# Patient Record
Sex: FEMALE | URBAN - METROPOLITAN AREA
[De-identification: names, ages, dates, MRNs, and addresses within clinical notes are randomized per-mention and may not be internally consistent; named-entity substitution may affect disease eponyms.]

---

## 2023-02-28 LAB
ALANINE AMINOTRANSFERASE (SGPT) (U/L) IN SER/PLAS: 13 U/L (ref 7–45)
ALBUMIN (G/DL) IN SER/PLAS: 4.4 G/DL (ref 3.4–5)
ALKALINE PHOSPHATASE (U/L) IN SER/PLAS: 76 U/L (ref 33–136)
ANION GAP IN SER/PLAS: 9 MMOL/L (ref 10–20)
ASPARTATE AMINOTRANSFERASE (SGOT) (U/L) IN SER/PLAS: 30 U/L (ref 9–39)
BILIRUBIN TOTAL (MG/DL) IN SER/PLAS: 1.3 MG/DL (ref 0–1.2)
CALCIUM (MG/DL) IN SER/PLAS: 9.8 MG/DL (ref 8.6–10.6)
CARBON DIOXIDE, TOTAL (MMOL/L) IN SER/PLAS: 34 MMOL/L (ref 21–32)
CHLORIDE (MMOL/L) IN SER/PLAS: 98 MMOL/L (ref 98–107)
CHOLESTEROL (MG/DL) IN SER/PLAS: 178 MG/DL (ref 0–199)
CHOLESTEROL IN HDL (MG/DL) IN SER/PLAS: 85.7 MG/DL
CHOLESTEROL/HDL RATIO: 2.1
CREATININE (MG/DL) IN SER/PLAS: 0.71 MG/DL (ref 0.5–1.05)
GFR FEMALE: 85 ML/MIN/1.73M2
GLUCOSE (MG/DL) IN SER/PLAS: 94 MG/DL (ref 74–99)
LDL: 78 MG/DL (ref 0–99)
POTASSIUM (MMOL/L) IN SER/PLAS: 3.9 MMOL/L (ref 3.5–5.3)
PROTEIN TOTAL: 7.2 G/DL (ref 6.4–8.2)
SODIUM (MMOL/L) IN SER/PLAS: 137 MMOL/L (ref 136–145)
TRIGLYCERIDE (MG/DL) IN SER/PLAS: 72 MG/DL (ref 0–149)
UREA NITROGEN (MG/DL) IN SER/PLAS: 16 MG/DL (ref 6–23)
VLDL: 14 MG/DL (ref 0–40)

## 2023-04-03 ENCOUNTER — OFFICE VISIT (OUTPATIENT)
Dept: PRIMARY CARE | Facility: CLINIC | Age: 83
End: 2023-04-03
Payer: MEDICARE

## 2023-04-03 VITALS
SYSTOLIC BLOOD PRESSURE: 142 MMHG | BODY MASS INDEX: 21.41 KG/M2 | WEIGHT: 102 LBS | HEART RATE: 60 BPM | OXYGEN SATURATION: 98 % | HEIGHT: 58 IN | DIASTOLIC BLOOD PRESSURE: 68 MMHG | TEMPERATURE: 97.2 F

## 2023-04-03 DIAGNOSIS — S06.9X0D TRAUMATIC BRAIN INJURY, WITHOUT LOSS OF CONSCIOUSNESS, SUBSEQUENT ENCOUNTER: ICD-10-CM

## 2023-04-03 DIAGNOSIS — G25.81 RESTLESS LEG SYNDROME: Primary | ICD-10-CM

## 2023-04-03 DIAGNOSIS — S06.9XAD TRAUMATIC BRAIN INJURY, WITH UNKNOWN LOSS OF CONSCIOUSNESS STATUS, SUBSEQUENT ENCOUNTER: ICD-10-CM

## 2023-04-03 PROCEDURE — 99213 OFFICE O/P EST LOW 20 MIN: CPT | Performed by: FAMILY MEDICINE

## 2023-04-03 PROCEDURE — 1170F FXNL STATUS ASSESSED: CPT | Performed by: FAMILY MEDICINE

## 2023-04-03 PROCEDURE — 1036F TOBACCO NON-USER: CPT | Performed by: FAMILY MEDICINE

## 2023-04-03 PROCEDURE — 1160F RVW MEDS BY RX/DR IN RCRD: CPT | Performed by: FAMILY MEDICINE

## 2023-04-03 PROCEDURE — 1159F MED LIST DOCD IN RCRD: CPT | Performed by: FAMILY MEDICINE

## 2023-04-03 RX ORDER — LOSARTAN POTASSIUM 50 MG/1
TABLET ORAL
COMMUNITY
Start: 2020-03-06 | End: 2023-08-14

## 2023-04-03 RX ORDER — SIMVASTATIN 20 MG/1
20 TABLET, FILM COATED ORAL NIGHTLY
COMMUNITY
Start: 2013-02-04 | End: 2023-05-01

## 2023-04-03 RX ORDER — HYDROCHLOROTHIAZIDE 12.5 MG/1
12.5 TABLET ORAL EVERY 12 HOURS
COMMUNITY
Start: 2020-03-06 | End: 2023-06-01 | Stop reason: SDUPTHER

## 2023-04-03 RX ORDER — CITALOPRAM 10 MG/1
10 TABLET ORAL DAILY
COMMUNITY
Start: 2020-12-14 | End: 2023-09-11

## 2023-04-03 RX ORDER — CALCIUM CITRATE/VITAMIN D3 200MG-6.25
2 TABLET ORAL DAILY
COMMUNITY
Start: 2016-11-01

## 2023-04-03 RX ORDER — DONEPEZIL HYDROCHLORIDE 10 MG/1
10 TABLET, FILM COATED ORAL
COMMUNITY
Start: 2023-03-02

## 2023-04-03 ASSESSMENT — ACTIVITIES OF DAILY LIVING (ADL)
DOING_HOUSEWORK: INDEPENDENT
DRESSING: INDEPENDENT
MANAGING_FINANCES: NEEDS ASSISTANCE
BATHING: INDEPENDENT
GROCERY_SHOPPING: NEEDS ASSISTANCE
TAKING_MEDICATION: INDEPENDENT

## 2023-04-03 ASSESSMENT — ENCOUNTER SYMPTOMS
LOSS OF SENSATION IN FEET: 0
FATIGUE: 0
ACTIVITY CHANGE: 0
GASTROINTESTINAL NEGATIVE: 1
OCCASIONAL FEELINGS OF UNSTEADINESS: 0
SLEEP DISTURBANCE: 1
DIAPHORESIS: 0
MUSCULOSKELETAL NEGATIVE: 1
CARDIOVASCULAR NEGATIVE: 1
APPETITE CHANGE: 0
DEPRESSION: 0
NERVOUS/ANXIOUS: 0
AGITATION: 0

## 2023-04-03 ASSESSMENT — COLUMBIA-SUICIDE SEVERITY RATING SCALE - C-SSRS
2. HAVE YOU ACTUALLY HAD ANY THOUGHTS OF KILLING YOURSELF?: NO
6. HAVE YOU EVER DONE ANYTHING, STARTED TO DO ANYTHING, OR PREPARED TO DO ANYTHING TO END YOUR LIFE?: NO
1. IN THE PAST MONTH, HAVE YOU WISHED YOU WERE DEAD OR WISHED YOU COULD GO TO SLEEP AND NOT WAKE UP?: NO

## 2023-04-03 ASSESSMENT — PATIENT HEALTH QUESTIONNAIRE - PHQ9
SUM OF ALL RESPONSES TO PHQ9 QUESTIONS 1 AND 2: 0
1. LITTLE INTEREST OR PLEASURE IN DOING THINGS: NOT AT ALL
2. FEELING DOWN, DEPRESSED OR HOPELESS: NOT AT ALL

## 2023-04-03 NOTE — PROGRESS NOTES
Subjective   Patient ID: Felicitas Vo is a 83 y.o. female who presents for Follow-up (Medications/).  Patient has been started on Aricept therapy.  Has been doing overall well    HPI patient presents for follow-up. Doing well.  2 days week.  Working on balance.  Continues to go twice a week for workout program.  She has been eating well plenty of food at home.    She has been able to dress herself and do other activities her son helps her with medication management.  She is getting some leg cramping at night.    Sometimes legs feel little uneasy at night as well.  Denies any real jerking motion associated with this.    No difficulties with headaches or double vision or blurring vision.  No troubles with sore throat or difficulty swallowing no abdominal pain or discomfort no blood in stool or black tarry stool.  No swelling of the legs or feet.  No numbness no tingling the legs or feet.    At night sometimes patient may notice it when the lights are turned off that she may see silhouettes of people.    She does not hear any noise when she turns a light on these things do not exist.    Uncertain as the timing of this or if it is associated with Aricept therapy.    Patient had history of traumatic brain injury but that was years ago she has had no subsequent troubles.  No headache she denies being off balance at this time        Review of Systems   Constitutional:  Negative for activity change, appetite change, diaphoresis and fatigue.   HENT:  Negative for ear pain, mouth sores and nosebleeds.    Respiratory: Negative.     Cardiovascular: Negative.    Gastrointestinal: Negative.    Endocrine: Negative for cold intolerance and heat intolerance.   Genitourinary: Negative.    Musculoskeletal: Negative.    Neurological:  Negative for tremors, seizures, syncope, speech difficulty, numbness and headaches.   Psychiatric/Behavioral:  Positive for sleep disturbance. Negative for agitation, behavioral problems, confusion,  "self-injury and suicidal ideas. The patient is not nervous/anxious.        Objective   /88   Pulse 60   Temp 36.2 °C (97.2 °F)   Ht 1.473 m (4' 10\")   Wt 46.3 kg (102 lb)   SpO2 98%   BMI 21.32 kg/m²   BSA Body surface area is 1.38 meters squared.      Physical Exam  Constitutional:       Appearance: Normal appearance.   HENT:      Head: Normocephalic.   Cardiovascular:      Rate and Rhythm: Normal rate and regular rhythm.   Pulmonary:      Effort: Pulmonary effort is normal.      Breath sounds: Normal breath sounds.   Abdominal:      General: Abdomen is flat.      Palpations: Abdomen is soft.   Musculoskeletal:         General: Normal range of motion.      Cervical back: Normal range of motion.   Neurological:      General: No focal deficit present.      Mental Status: She is alert. Mental status is at baseline.      Cranial Nerves: No cranial nerve deficit.      Sensory: No sensory deficit.      Motor: No weakness.      Coordination: Coordination normal.      Gait: Gait normal.      Comments: Cranial nerves II through XII are intact.    Deep tendon reflexes of lower extremities are equal and strong.  No calf tenderness.    Dorsalis pedis pulses are strong   Psychiatric:         Mood and Affect: Mood normal.         Judgment: Judgment normal.       Orders Only on 02/28/2023   Component Date Value Ref Range Status    Cholesterol 02/28/2023 178  0 - 199 mg/dL Final    Comment: .      AGE      DESIRABLE   BORDERLINE HIGH   HIGH     0-19 Y     0 - 169       170 - 199     >/= 200    20-24 Y     0 - 189       190 - 224     >/= 225         >24 Y     0 - 199       200 - 239     >/= 240   **All ranges are based on fasting samples. Specific   therapeutic targets will vary based on patient-specific   cardiac risk.  .   Pediatric guidelines reference:Pediatrics 2011, 128(S5).   Adult guidelines reference: NCEP ATPIII Guidelines,     PADDY 2001, 258:2486-97  .   Venipuncture immediately after or during the    " administration of Metamizole may lead to falsely   low results. Testing should be performed immediately   prior to Metamizole dosing.      HDL 02/28/2023 85.7  mg/dL Final    Comment: .      AGE      VERY LOW   LOW     NORMAL    HIGH       0-19 Y       < 35   < 40     40-45     ----    20-24 Y       ----   < 40       >45     ----      >24 Y       ----   < 40     40-60      >60  .      Cholesterol/HDL Ratio 02/28/2023 2.1   Final    Comment: REF VALUES  DESIRABLE  < 3.4  HIGH RISK  > 5.0      LDL 02/28/2023 78  0 - 99 mg/dL Final    Comment: .                           NEAR      BORD      AGE      DESIRABLE  OPTIMAL    HIGH     HIGH     VERY HIGH     0-19 Y     0 - 109     ---    110-129   >/= 130     ----    20-24 Y     0 - 119     ---    120-159   >/= 160     ----      >24 Y     0 -  99   100-129  130-159   160-189     >/=190  .      VLDL 02/28/2023 14  0 - 40 mg/dL Final    Triglycerides 02/28/2023 72  0 - 149 mg/dL Final    Comment: .      AGE      DESIRABLE   BORDERLINE HIGH   HIGH     VERY HIGH   0 D-90 D    19 - 174         ----         ----        ----  91 D- 9 Y     0 -  74        75 -  99     >/= 100      ----    10-19 Y     0 -  89        90 - 129     >/= 130      ----    20-24 Y     0 - 114       115 - 149     >/= 150      ----         >24 Y     0 - 149       150 - 199    200- 499    >/= 500  .   Venipuncture immediately after or during the    administration of Metamizole may lead to falsely   low results. Testing should be performed immediately   prior to Metamizole dosing.     Orders Only on 02/28/2023   Component Date Value Ref Range Status    Glucose 02/28/2023 94  74 - 99 mg/dL Final    Sodium 02/28/2023 137  136 - 145 mmol/L Final    Potassium 02/28/2023 3.9  3.5 - 5.3 mmol/L Final    Chloride 02/28/2023 98  98 - 107 mmol/L Final    Bicarbonate 02/28/2023 34 (H)  21 - 32 mmol/L Final    Anion Gap 02/28/2023 9 (L)  10 - 20 mmol/L Final    Urea Nitrogen 02/28/2023 16  6 - 23 mg/dL Final    Creatinine  02/28/2023 0.71  0.50 - 1.05 mg/dL Final    GFR Female 02/28/2023 85  >90 mL/min/1.73m2 Final    Comment:  CALCULATIONS OF ESTIMATED GFR ARE PERFORMED   USING THE 2021 CKD-EPI STUDY REFIT EQUATION   WITHOUT THE RACE VARIABLE FOR THE IDMS-TRACEABLE   CREATININE METHODS.    https://jasn.asnjournals.org/content/early/2021/09/22/ASN.9254138219      Calcium 02/28/2023 9.8  8.6 - 10.6 mg/dL Final    Albumin 02/28/2023 4.4  3.4 - 5.0 g/dL Final    Alkaline Phosphatase 02/28/2023 76  33 - 136 U/L Final    Total Protein 02/28/2023 7.2  6.4 - 8.2 g/dL Final    AST 02/28/2023 30  9 - 39 U/L Final    Total Bilirubin 02/28/2023 1.3 (H)  0.0 - 1.2 mg/dL Final    ALT (SGPT) 02/28/2023 13  7 - 45 U/L Final    Comment:  Patients treated with Sulfasalazine may generate    falsely decreased results for ALT.     Legacy Encounter on 06/09/2022   Component Date Value Ref Range Status    Vitamin B-12 06/09/2022 853  211 - 911 pg/mL Final    Glucose 06/09/2022 89  74 - 99 mg/dL Final    Sodium 06/09/2022 137  136 - 145 mmol/L Final    Potassium 06/09/2022 3.8  3.5 - 5.3 mmol/L Final    Chloride 06/09/2022 100  98 - 107 mmol/L Final    Bicarbonate 06/09/2022 30  21 - 32 mmol/L Final    Anion Gap 06/09/2022 11  10 - 20 mmol/L Final    Urea Nitrogen 06/09/2022 21  6 - 23 mg/dL Final    Creatinine 06/09/2022 0.68  0.50 - 1.05 mg/dL Final    GFR Female 06/09/2022 88  >90 mL/min/1.73m2 Final    Comment:  CALCULATIONS OF ESTIMATED GFR ARE PERFORMED   USING THE 2021 CKD-EPI STUDY REFIT EQUATION   WITHOUT THE RACE VARIABLE FOR THE IDMS-TRACEABLE   CREATININE METHODS.    https://jasn.asnjournals.org/content/early/2021/09/22/ASN.7366874630      Calcium 06/09/2022 9.4  8.6 - 10.6 mg/dL Final    Albumin 06/09/2022 4.3  3.4 - 5.0 g/dL Final    Alkaline Phosphatase 06/09/2022 59  33 - 136 U/L Final    Total Protein 06/09/2022 6.9  6.4 - 8.2 g/dL Final    AST 06/09/2022 31  9 - 39 U/L Final    Total Bilirubin 06/09/2022 1.1  0.0 - 1.2 mg/dL Final    ALT  (SGPT) 06/09/2022 14  7 - 45 U/L Final    Comment:  Patients treated with Sulfasalazine may generate    falsely decreased results for ALT.      TSH 06/09/2022 4.15 (H)  0.44 - 3.98 mIU/L Final    Comment:  TSH testing is performed using different testing    methodology at [facility] than at other    [facility]. Direct result comparisons should    only be made within the same method.      Cholesterol 06/09/2022 164  0 - 199 mg/dL Final    Comment: .      AGE      DESIRABLE   BORDERLINE HIGH   HIGH     0-19 Y     0 - 169       170 - 199     >/= 200    20-24 Y     0 - 189       190 - 224     >/= 225         >24 Y     0 - 199       200 - 239     >/= 240   **All ranges are based on fasting samples. Specific   therapeutic targets will vary based on patient-specific   cardiac risk.  .   Pediatric guidelines reference:Pediatrics 2011, 128(S5).   Adult guidelines reference: NCEP ATPIII Guidelines,     PADDY 2001, 258:2486-97  .   Venipuncture immediately after or during the    administration of Metamizole may lead to falsely   low results. Testing should be performed immediately   prior to Metamizole dosing.      HDL 06/09/2022 78.0  mg/dL Final    Comment: .      AGE      VERY LOW   LOW     NORMAL    HIGH       0-19 Y       < 35   < 40     40-45     ----    20-24 Y       ----   < 40       >45     ----      >24 Y       ----   < 40     40-60      >60  .      Cholesterol/HDL Ratio 06/09/2022 2.1   Final    Comment: REF VALUES  DESIRABLE  < 3.4  HIGH RISK  > 5.0      LDL 06/09/2022 73  0 - 99 mg/dL Final    Comment: .                           NEAR      BORD      AGE      DESIRABLE  OPTIMAL    HIGH     HIGH     VERY HIGH     0-19 Y     0 - 109     ---    110-129   >/= 130     ----    20-24 Y     0 - 119     ---    120-159   >/= 160     ----      >24 Y     0 -  99   100-129  130-159   160-189     >/=190  .      VLDL 06/09/2022 13  0 - 40 mg/dL Final    Triglycerides 06/09/2022 67  0 - 149 mg/dL Final     Comment: .      AGE      DESIRABLE   BORDERLINE HIGH   HIGH     VERY HIGH   0 D-90 D    19 - 174         ----         ----        ----  91 D- 9 Y     0 -  74        75 -  99     >/= 100      ----    10-19 Y     0 -  89        90 - 129     >/= 130      ----    20-24 Y     0 - 114       115 - 149     >/= 150      ----         >24 Y     0 - 149       150 - 199    200- 499    >/= 500  .   Venipuncture immediately after or during the    administration of Metamizole may lead to falsely   low results. Testing should be performed immediately   prior to Metamizole dosing.      WBC 06/09/2022 4.0 (L)  4.4 - 11.3 x10E9/L Final    nRBC 06/09/2022 0.0  0.0 - 0.0 /100 WBC Final    RBC 06/09/2022 4.25  4.00 - 5.20 x10E12/L Final    Hemoglobin 06/09/2022 13.4  12.0 - 16.0 g/dL Final    Hematocrit 06/09/2022 41.5  36.0 - 46.0 % Final    MCV 06/09/2022 98  80 - 100 fL Final    MCHC 06/09/2022 32.3  32.0 - 36.0 g/dL Final    Platelets 06/09/2022 179  150 - 450 x10E9/L Final    RDW 06/09/2022 12.8  11.5 - 14.5 % Final    Neutrophils % 06/09/2022 63.7  40.0 - 80.0 % Final    Immature Granulocytes %, Automated 06/09/2022 0.2  0.0 - 0.9 % Final    Comment:  Immature Granulocyte Count (IG) includes promyelocytes,    myelocytes and metamyelocytes but does not include bands.   Percent differential counts (%) should be interpreted in the   context of the absolute cell counts (cells/L).      Lymphocytes % 06/09/2022 26.7  13.0 - 44.0 % Final    Monocytes % 06/09/2022 8.2  2.0 - 10.0 % Final    Eosinophils % 06/09/2022 1.0  0.0 - 6.0 % Final    Basophils % 06/09/2022 0.2  0.0 - 2.0 % Final    Neutrophils Absolute 06/09/2022 2.55  1.60 - 5.50 x10E9/L Final    Lymphocytes Absolute 06/09/2022 1.07  0.80 - 3.00 x10E9/L Final    Monocytes Absolute 06/09/2022 0.33  0.05 - 0.80 x10E9/L Final    Eosinophils Absolute 06/09/2022 0.04  0.00 - 0.40 x10E9/L Final    Basophils Absolute 06/09/2022 0.01  0.00 - 0.10 x10E9/L Final    Syphilis Total Ab  06/09/2022 NONREACTIVE  NONREACTIVE Final    Comment: No significant level of Treponema pallidum antibody detected. Repeat testing   in 2 to 4 weeks may be considered if early infection or incubating syphilis   infection is suspected.       Current Outpatient Medications on File Prior to Visit   Medication Sig Dispense Refill    anjali cit-mag-D3-Zn--man-bor (Citracal-D3 Plus Magnesium) 250- mg-mg-unit tablet Take 2 tablets by mouth once daily.      citalopram (CeleXA) 10 mg tablet Take 1 tablet (10 mg) by mouth once daily.      donepezil (Aricept) 10 mg tablet Take 1 tablet (10 mg) by mouth once daily.      hydroCHLOROthiazide (HYDRODiuril) 12.5 mg tablet Take 1 tablet (12.5 mg) by mouth in the morning and 1 tablet (12.5 mg) in the evening.      losartan (Cozaar) 50 mg tablet       multivit-min/iron/folic/lutein (CENTRUM SILVER WOMEN ORAL) Take 1 tablet by mouth once daily.      simvastatin (Zocor) 20 mg tablet Take 1 tablet (20 mg) by mouth once daily at bedtime.       No current facility-administered medications on file prior to visit.     No images are attached to the encounter.            Assessment/Plan   Problem List Items Addressed This Visit          Nervous    Restless leg syndrome - Primary     Evaluating for restless leg.  Lab studies being performed         Traumatic brain injury, without loss of consciousness, subsequent encounter     This was years ago and doing well.

## 2023-04-03 NOTE — PATIENT INSTRUCTIONS
Lab studies are being performed regarding restless leg.  Please do leg stretching as we have discussed.  Please continue follow-up with geriatric health clinic.    Medications reviewed and reconciled.

## 2023-04-04 ASSESSMENT — ENCOUNTER SYMPTOMS
SEIZURES: 0
SPEECH DIFFICULTY: 0
RESPIRATORY NEGATIVE: 1
TREMORS: 0
CONFUSION: 0
HEADACHES: 0
NUMBNESS: 0

## 2023-05-01 DIAGNOSIS — E78.5 HYPERLIPIDEMIA, UNSPECIFIED HYPERLIPIDEMIA TYPE: Primary | ICD-10-CM

## 2023-05-01 RX ORDER — SIMVASTATIN 20 MG/1
TABLET, FILM COATED ORAL
Qty: 90 TABLET | Refills: 0 | Status: SHIPPED | OUTPATIENT
Start: 2023-05-01 | End: 2023-08-01

## 2023-06-01 ENCOUNTER — TELEPHONE (OUTPATIENT)
Dept: PRIMARY CARE | Facility: CLINIC | Age: 83
End: 2023-06-01
Payer: MEDICARE

## 2023-06-01 DIAGNOSIS — I10 BENIGN ESSENTIAL HYPERTENSION: Primary | ICD-10-CM

## 2023-06-01 DIAGNOSIS — I10 BENIGN ESSENTIAL HYPERTENSION: ICD-10-CM

## 2023-06-01 PROBLEM — R00.2 PALPITATIONS: Status: ACTIVE | Noted: 2023-06-01

## 2023-06-01 PROBLEM — M25.812 SHOULDER IMPINGEMENT, LEFT: Status: ACTIVE | Noted: 2023-06-01

## 2023-06-01 PROBLEM — G30.1 MILD LATE ONSET ALZHEIMER'S DEMENTIA WITHOUT BEHAVIORAL DISTURBANCE, PSYCHOTIC DISTURBANCE, MOOD DISTURBANCE, OR ANXIETY (MULTI): Status: ACTIVE | Noted: 2023-03-02

## 2023-06-01 PROBLEM — R35.0 URINARY FREQUENCY: Status: ACTIVE | Noted: 2023-06-01

## 2023-06-01 PROBLEM — R60.0 EDEMA OF BOTH LEGS: Status: ACTIVE | Noted: 2023-06-01

## 2023-06-01 PROBLEM — S06.9XAA TRAUMATIC BRAIN INJURY (MULTI): Status: ACTIVE | Noted: 2023-04-03

## 2023-06-01 PROBLEM — N85.9 FLUID IN ENDOMETRIAL CAVITY: Status: ACTIVE | Noted: 2023-06-01

## 2023-06-01 PROBLEM — U07.1 COVID-19: Status: ACTIVE | Noted: 2023-06-01

## 2023-06-01 PROBLEM — R05.9 COUGH: Status: ACTIVE | Noted: 2023-06-01

## 2023-06-01 PROBLEM — R63.4 WEIGHT LOSS: Status: ACTIVE | Noted: 2023-06-01

## 2023-06-01 PROBLEM — H81.399 PERIPHERAL VERTIGO: Status: ACTIVE | Noted: 2023-06-01

## 2023-06-01 PROBLEM — F43.20 REACTION, SITUATIONAL: Status: ACTIVE | Noted: 2023-06-01

## 2023-06-01 PROBLEM — F02.A0 MILD LATE ONSET ALZHEIMER'S DEMENTIA WITHOUT BEHAVIORAL DISTURBANCE, PSYCHOTIC DISTURBANCE, MOOD DISTURBANCE, OR ANXIETY (MULTI): Status: ACTIVE | Noted: 2023-03-02

## 2023-06-01 PROBLEM — M81.0 OSTEOPOROSIS: Status: ACTIVE | Noted: 2023-06-01

## 2023-06-01 RX ORDER — HYDROCHLOROTHIAZIDE 12.5 MG/1
12.5 TABLET ORAL EVERY 12 HOURS
Qty: 90 TABLET | Refills: 3 | Status: SHIPPED | OUTPATIENT
Start: 2023-06-01 | End: 2024-05-28

## 2023-06-01 RX ORDER — HYDROCHLOROTHIAZIDE 12.5 MG/1
12.5 TABLET ORAL EVERY 12 HOURS
Qty: 90 TABLET | Refills: 1 | Status: CANCELLED | OUTPATIENT
Start: 2023-06-01

## 2023-06-02 RX ORDER — HYDROCHLOROTHIAZIDE 12.5 MG/1
TABLET ORAL
Qty: 180 TABLET | Refills: 3 | OUTPATIENT
Start: 2023-06-02

## 2023-08-01 DIAGNOSIS — E78.5 HYPERLIPIDEMIA, UNSPECIFIED HYPERLIPIDEMIA TYPE: ICD-10-CM

## 2023-08-01 RX ORDER — SIMVASTATIN 20 MG/1
TABLET, FILM COATED ORAL
Qty: 90 TABLET | Refills: 3 | Status: SHIPPED | OUTPATIENT
Start: 2023-08-01

## 2023-08-14 DIAGNOSIS — I10 BENIGN ESSENTIAL HYPERTENSION: Primary | ICD-10-CM

## 2023-08-14 RX ORDER — LOSARTAN POTASSIUM 50 MG/1
TABLET ORAL
Qty: 180 TABLET | Refills: 3 | Status: SHIPPED | OUTPATIENT
Start: 2023-08-14

## 2023-09-11 DIAGNOSIS — F43.20 ADJUSTMENT DISORDER, UNSPECIFIED TYPE: Primary | ICD-10-CM

## 2023-09-11 RX ORDER — CITALOPRAM 10 MG/1
10 TABLET ORAL DAILY
Qty: 30 TABLET | Refills: 11 | Status: SHIPPED | OUTPATIENT
Start: 2023-09-11

## 2024-04-18 ENCOUNTER — LAB (OUTPATIENT)
Dept: LAB | Facility: LAB | Age: 84
End: 2024-04-18
Payer: MEDICARE

## 2024-04-18 ENCOUNTER — OFFICE VISIT (OUTPATIENT)
Dept: PRIMARY CARE | Facility: CLINIC | Age: 84
End: 2024-04-18
Payer: MEDICARE

## 2024-04-18 VITALS
OXYGEN SATURATION: 100 % | TEMPERATURE: 97.1 F | HEIGHT: 58 IN | DIASTOLIC BLOOD PRESSURE: 62 MMHG | SYSTOLIC BLOOD PRESSURE: 108 MMHG | BODY MASS INDEX: 21.62 KG/M2 | WEIGHT: 103 LBS | HEART RATE: 60 BPM

## 2024-04-18 DIAGNOSIS — I10 BENIGN ESSENTIAL HYPERTENSION: ICD-10-CM

## 2024-04-18 DIAGNOSIS — N64.89 HEMATOMA OF LEFT BREAST: Primary | ICD-10-CM

## 2024-04-18 DIAGNOSIS — N64.89 HEMATOMA OF LEFT BREAST: ICD-10-CM

## 2024-04-18 PROCEDURE — 1036F TOBACCO NON-USER: CPT | Performed by: FAMILY MEDICINE

## 2024-04-18 PROCEDURE — 85025 COMPLETE CBC W/AUTO DIFF WBC: CPT

## 2024-04-18 PROCEDURE — 1159F MED LIST DOCD IN RCRD: CPT | Performed by: FAMILY MEDICINE

## 2024-04-18 PROCEDURE — 36415 COLL VENOUS BLD VENIPUNCTURE: CPT

## 2024-04-18 PROCEDURE — 85610 PROTHROMBIN TIME: CPT

## 2024-04-18 PROCEDURE — 3078F DIAST BP <80 MM HG: CPT | Performed by: FAMILY MEDICINE

## 2024-04-18 PROCEDURE — 3074F SYST BP LT 130 MM HG: CPT | Performed by: FAMILY MEDICINE

## 2024-04-18 PROCEDURE — 99213 OFFICE O/P EST LOW 20 MIN: CPT | Performed by: FAMILY MEDICINE

## 2024-04-18 ASSESSMENT — PATIENT HEALTH QUESTIONNAIRE - PHQ9
10. IF YOU CHECKED OFF ANY PROBLEMS, HOW DIFFICULT HAVE THESE PROBLEMS MADE IT FOR YOU TO DO YOUR WORK, TAKE CARE OF THINGS AT HOME, OR GET ALONG WITH OTHER PEOPLE: NOT DIFFICULT AT ALL
SUM OF ALL RESPONSES TO PHQ9 QUESTIONS 1 AND 2: 0
1. LITTLE INTEREST OR PLEASURE IN DOING THINGS: NOT AT ALL
2. FEELING DOWN, DEPRESSED OR HOPELESS: NOT AT ALL

## 2024-04-18 ASSESSMENT — ENCOUNTER SYMPTOMS
BLOOD IN STOOL: 0
ABDOMINAL DISTENTION: 0
WHEEZING: 0
CHOKING: 0
DEPRESSION: 1
EYE ITCHING: 0
OCCASIONAL FEELINGS OF UNSTEADINESS: 0
LOSS OF SENSATION IN FEET: 0
EYE PAIN: 0

## 2024-04-18 NOTE — PATIENT INSTRUCTIONS
Lab studies are going to be performed because of bruising CBC, INR, PTT going to be performed.    Ultrasound and diagnostic mammogram going to be performed.

## 2024-04-18 NOTE — PROGRESS NOTES
"Subjective   Patient ID: Felicitas Vo is a 84 y.o. female who presents for Breast Problem (bruise).    Patient noted some bruising across the left breast area.  She had no pain no discomfort is unaware of any trauma to the area.    Patient had no nipple discharge.    No pain in the area of the armpit.  No troubles with abdominal pain or discomfort.  No troubles with swelling of the legs or feet.  She has had a little bit of bruising on the hand in the past.    No bleeding with brushing the teeth.  No troubles with blood in the stool or black stool or vaginal bleeding    Breast Problem   patient noted bruise in the area of the breast    Review of Systems   HENT:  Negative for ear discharge and mouth sores.    Eyes:  Negative for pain and itching.   Respiratory:  Negative for choking and wheezing.    Gastrointestinal:  Negative for abdominal distention and blood in stool.   Skin:         Bruising of the left breast and left hand       Objective   /62   Pulse 60   Temp 36.2 °C (97.1 °F)   Ht 1.473 m (4' 10\")   Wt 46.7 kg (103 lb)   SpO2 100%   BMI 21.53 kg/m²   BSA Body surface area is 1.38 meters squared.      Physical Exam  Constitutional:       Appearance: Normal appearance.   HENT:      Head: Normocephalic.   Cardiovascular:      Rate and Rhythm: Normal rate and regular rhythm.      Pulses: Normal pulses.   Pulmonary:      Effort: Pulmonary effort is normal.   Neurological:      Mental Status: She is alert.     No axillary mass noted.    Bruising noted across the breast at the level of the nipple.  I do not appreciate any nipple discharge.    Some fibrocystic changes felt.  I do not appreciate any obvious mass  No visits with results within 1 Year(s) from this visit.   Latest known visit with results is:   Orders Only on 02/28/2023   Component Date Value Ref Range Status    Cholesterol 02/28/2023 178  0 - 199 mg/dL Final    Comment: .      AGE      DESIRABLE   BORDERLINE HIGH   HIGH     0-19 Y     0 - " 169       170 - 199     >/= 200    20-24 Y     0 - 189       190 - 224     >/= 225         >24 Y     0 - 199       200 - 239     >/= 240   **All ranges are based on fasting samples. Specific   therapeutic targets will vary based on patient-specific   cardiac risk.  .   Pediatric guidelines reference:Pediatrics 2011, 128(S5).   Adult guidelines reference: NCEP ATPIII Guidelines,     PADDY 2001, 258:2486-97  .   Venipuncture immediately after or during the    administration of Metamizole may lead to falsely   low results. Testing should be performed immediately   prior to Metamizole dosing.      HDL 02/28/2023 85.7  mg/dL Final    Comment: .      AGE      VERY LOW   LOW     NORMAL    HIGH       0-19 Y       < 35   < 40     40-45     ----    20-24 Y       ----   < 40       >45     ----      >24 Y       ----   < 40     40-60      >60  .      Cholesterol/HDL Ratio 02/28/2023 2.1   Final    Comment: REF VALUES  DESIRABLE  < 3.4  HIGH RISK  > 5.0      LDL 02/28/2023 78  0 - 99 mg/dL Final    Comment: .                           NEAR      BORD      AGE      DESIRABLE  OPTIMAL    HIGH     HIGH     VERY HIGH     0-19 Y     0 - 109     ---    110-129   >/= 130     ----    20-24 Y     0 - 119     ---    120-159   >/= 160     ----      >24 Y     0 -  99   100-129  130-159   160-189     >/=190  .      VLDL 02/28/2023 14  0 - 40 mg/dL Final    Triglycerides 02/28/2023 72  0 - 149 mg/dL Final    Comment: .      AGE      DESIRABLE   BORDERLINE HIGH   HIGH     VERY HIGH   0 D-90 D    19 - 174         ----         ----        ----  91 D- 9 Y     0 -  74        75 -  99     >/= 100      ----    10-19 Y     0 -  89        90 - 129     >/= 130      ----    20-24 Y     0 - 114       115 - 149     >/= 150      ----         >24 Y     0 - 149       150 - 199    200- 499    >/= 500  .   Venipuncture immediately after or during the    administration of Metamizole may lead to falsely   low results. Testing should be performed immediately   prior  to Metamizole dosing.       Current Outpatient Medications on File Prior to Visit   Medication Sig Dispense Refill    anjali cit-mag-D3-Zn--man-bor (Citracal-D3 Plus Magnesium) 250- mg-mg-unit tablet Take 2 tablets by mouth once daily.      citalopram (CeleXA) 10 mg tablet TAKE ONE TABLET BY MOUTH EVERY DAY 30 tablet 11    donepezil (Aricept) 10 mg tablet Take 1 tablet (10 mg) by mouth once daily.      hydroCHLOROthiazide (HYDRODiuril) 12.5 mg tablet Take 1 tablet (12.5 mg) by mouth every 12 hours. 90 tablet 3    losartan (Cozaar) 50 mg tablet TAKE ONE TABLET BY MOUTH EVERY MORNING AND TAKE ONE TABLET BY MOUTH EVERY EVENING 180 tablet 3    multivit-min/iron/folic/lutein (CENTRUM SILVER WOMEN ORAL) Take 1 tablet by mouth once daily.      simvastatin (Zocor) 20 mg tablet TAKE ONE TABLET BY MOUTH EVERY EVENING (NEED BLOOD TEST AND APPOINTMENT WITH DR HOLLAND) 90 tablet 3     No current facility-administered medications on file prior to visit.     No images are attached to the encounter.            Assessment/Plan   Problem List Items Addressed This Visit             ICD-10-CM    Hematoma of left breast - Primary N64.89     Appears to have hematoma of the left breast.  To do a diagnostic mammogram and ultrasound for further evaluation.  CBC, INR PTT going to be performed

## 2024-04-18 NOTE — ASSESSMENT & PLAN NOTE
Appears to have hematoma of the left breast.  To do a diagnostic mammogram and ultrasound for further evaluation.  CBC, INR PTT going to be performed

## 2024-04-19 LAB
BASOPHILS # BLD AUTO: 0.01 X10*3/UL (ref 0–0.1)
BASOPHILS NFR BLD AUTO: 0.2 %
EOSINOPHIL # BLD AUTO: 0.08 X10*3/UL (ref 0–0.4)
EOSINOPHIL NFR BLD AUTO: 2 %
ERYTHROCYTE [DISTWIDTH] IN BLOOD BY AUTOMATED COUNT: 13.1 % (ref 11.5–14.5)
HCT VFR BLD AUTO: 42.9 % (ref 36–46)
HGB BLD-MCNC: 13.5 G/DL (ref 12–16)
IMM GRANULOCYTES # BLD AUTO: 0.01 X10*3/UL (ref 0–0.5)
IMM GRANULOCYTES NFR BLD AUTO: 0.2 % (ref 0–0.9)
INR PPP: 1 (ref 0.9–1.1)
LYMPHOCYTES # BLD AUTO: 0.84 X10*3/UL (ref 0.8–3)
LYMPHOCYTES NFR BLD AUTO: 20.8 %
MCH RBC QN AUTO: 30.6 PG (ref 26–34)
MCHC RBC AUTO-ENTMCNC: 31.5 G/DL (ref 32–36)
MCV RBC AUTO: 97 FL (ref 80–100)
MONOCYTES # BLD AUTO: 0.33 X10*3/UL (ref 0.05–0.8)
MONOCYTES NFR BLD AUTO: 8.2 %
NEUTROPHILS # BLD AUTO: 2.77 X10*3/UL (ref 1.6–5.5)
NEUTROPHILS NFR BLD AUTO: 68.6 %
NRBC BLD-RTO: 0 /100 WBCS (ref 0–0)
PLATELET # BLD AUTO: 165 X10*3/UL (ref 150–450)
PROTHROMBIN TIME: 11.3 SECONDS (ref 9.8–12.8)
RBC # BLD AUTO: 4.41 X10*6/UL (ref 4–5.2)
WBC # BLD AUTO: 4 X10*3/UL (ref 4.4–11.3)

## 2024-05-27 DIAGNOSIS — I10 BENIGN ESSENTIAL HYPERTENSION: ICD-10-CM

## 2024-05-28 RX ORDER — HYDROCHLOROTHIAZIDE 12.5 MG/1
12.5 TABLET ORAL EVERY 12 HOURS
Qty: 90 TABLET | Refills: 3 | Status: SHIPPED | OUTPATIENT
Start: 2024-05-28

## 2024-07-30 DIAGNOSIS — E78.5 HYPERLIPIDEMIA, UNSPECIFIED HYPERLIPIDEMIA TYPE: ICD-10-CM

## 2024-07-30 RX ORDER — SIMVASTATIN 20 MG/1
TABLET, FILM COATED ORAL
Qty: 90 TABLET | Refills: 3 | Status: SHIPPED | OUTPATIENT
Start: 2024-07-30

## 2024-08-29 DIAGNOSIS — K12.0 APHTHOUS ULCER OF MOUTH: Primary | ICD-10-CM

## 2024-08-29 RX ORDER — TRIAMCINOLONE ACETONIDE 1 MG/G
PASTE DENTAL 2 TIMES DAILY
Qty: 5 G | Refills: 0 | Status: SHIPPED | OUTPATIENT
Start: 2024-08-29 | End: 2025-08-29

## 2024-08-29 NOTE — PROGRESS NOTES
Treating for mouth ulcers sending in prescription for patient                                                                                      Treating for mouth ulcer.  Sending in prescription for patient

## 2024-09-21 DIAGNOSIS — F43.20 ADJUSTMENT DISORDER, UNSPECIFIED TYPE: ICD-10-CM

## 2024-09-23 RX ORDER — CITALOPRAM 10 MG/1
10 TABLET ORAL DAILY
Qty: 30 TABLET | Refills: 0 | Status: SHIPPED | OUTPATIENT
Start: 2024-09-23

## 2024-11-14 DIAGNOSIS — I10 BENIGN ESSENTIAL HYPERTENSION: ICD-10-CM

## 2024-11-14 RX ORDER — LOSARTAN POTASSIUM 50 MG/1
TABLET ORAL
Qty: 180 TABLET | Refills: 0 | Status: SHIPPED | OUTPATIENT
Start: 2024-11-14

## 2024-12-02 DIAGNOSIS — F43.20 ADJUSTMENT DISORDER, UNSPECIFIED TYPE: ICD-10-CM

## 2024-12-02 RX ORDER — CITALOPRAM 10 MG/1
10 TABLET ORAL DAILY
Qty: 90 TABLET | Refills: 1 | Status: SHIPPED | OUTPATIENT
Start: 2024-12-02

## 2025-01-17 DIAGNOSIS — E78.5 HYPERLIPIDEMIA, UNSPECIFIED HYPERLIPIDEMIA TYPE: ICD-10-CM

## 2025-01-17 DIAGNOSIS — I10 BENIGN ESSENTIAL HYPERTENSION: ICD-10-CM

## 2025-01-21 ENCOUNTER — LAB (OUTPATIENT)
Dept: LAB | Facility: LAB | Age: 85
End: 2025-01-21
Payer: MEDICARE

## 2025-01-21 DIAGNOSIS — E78.5 HYPERLIPIDEMIA, UNSPECIFIED HYPERLIPIDEMIA TYPE: ICD-10-CM

## 2025-01-21 DIAGNOSIS — I10 BENIGN ESSENTIAL HYPERTENSION: ICD-10-CM

## 2025-01-21 LAB
ALBUMIN SERPL BCP-MCNC: 4.6 G/DL (ref 3.4–5)
ALP SERPL-CCNC: 73 U/L (ref 33–136)
ALT SERPL W P-5'-P-CCNC: 13 U/L (ref 7–45)
ANION GAP SERPL CALC-SCNC: 10 MMOL/L (ref 10–20)
AST SERPL W P-5'-P-CCNC: 27 U/L (ref 9–39)
BASOPHILS # BLD AUTO: 0.02 X10*3/UL (ref 0–0.1)
BASOPHILS NFR BLD AUTO: 0.5 %
BILIRUB SERPL-MCNC: 1.2 MG/DL (ref 0–1.2)
BUN SERPL-MCNC: 27 MG/DL (ref 6–23)
CALCIUM SERPL-MCNC: 9.7 MG/DL (ref 8.6–10.6)
CHLORIDE SERPL-SCNC: 101 MMOL/L (ref 98–107)
CHOLEST SERPL-MCNC: 190 MG/DL (ref 0–199)
CHOLESTEROL/HDL RATIO: 2.4
CO2 SERPL-SCNC: 33 MMOL/L (ref 21–32)
CREAT SERPL-MCNC: 0.74 MG/DL (ref 0.5–1.05)
EGFRCR SERPLBLD CKD-EPI 2021: 81 ML/MIN/1.73M*2
EOSINOPHIL # BLD AUTO: 0.13 X10*3/UL (ref 0–0.4)
EOSINOPHIL NFR BLD AUTO: 3 %
ERYTHROCYTE [DISTWIDTH] IN BLOOD BY AUTOMATED COUNT: 12.6 % (ref 11.5–14.5)
GLUCOSE SERPL-MCNC: 95 MG/DL (ref 74–99)
HCT VFR BLD AUTO: 41.7 % (ref 36–46)
HDLC SERPL-MCNC: 79.9 MG/DL
HGB BLD-MCNC: 13.4 G/DL (ref 12–16)
IMM GRANULOCYTES # BLD AUTO: 0.02 X10*3/UL (ref 0–0.5)
IMM GRANULOCYTES NFR BLD AUTO: 0.5 % (ref 0–0.9)
LDLC SERPL CALC-MCNC: 88 MG/DL
LYMPHOCYTES # BLD AUTO: 1.09 X10*3/UL (ref 0.8–3)
LYMPHOCYTES NFR BLD AUTO: 25.3 %
MCH RBC QN AUTO: 31.1 PG (ref 26–34)
MCHC RBC AUTO-ENTMCNC: 32.1 G/DL (ref 32–36)
MCV RBC AUTO: 97 FL (ref 80–100)
MONOCYTES # BLD AUTO: 0.41 X10*3/UL (ref 0.05–0.8)
MONOCYTES NFR BLD AUTO: 9.5 %
NEUTROPHILS # BLD AUTO: 2.63 X10*3/UL (ref 1.6–5.5)
NEUTROPHILS NFR BLD AUTO: 61.2 %
NON HDL CHOLESTEROL: 110 MG/DL (ref 0–149)
NRBC BLD-RTO: 0 /100 WBCS (ref 0–0)
PLATELET # BLD AUTO: 190 X10*3/UL (ref 150–450)
POTASSIUM SERPL-SCNC: 3.9 MMOL/L (ref 3.5–5.3)
PROT SERPL-MCNC: 7.1 G/DL (ref 6.4–8.2)
RBC # BLD AUTO: 4.31 X10*6/UL (ref 4–5.2)
SODIUM SERPL-SCNC: 140 MMOL/L (ref 136–145)
T4 FREE SERPL-MCNC: 1.22 NG/DL (ref 0.78–1.48)
TRIGL SERPL-MCNC: 111 MG/DL (ref 0–149)
TSH SERPL-ACNC: 5.47 MIU/L (ref 0.44–3.98)
VLDL: 22 MG/DL (ref 0–40)
WBC # BLD AUTO: 4.3 X10*3/UL (ref 4.4–11.3)

## 2025-01-21 PROCEDURE — 84439 ASSAY OF FREE THYROXINE: CPT

## 2025-01-21 PROCEDURE — 85025 COMPLETE CBC W/AUTO DIFF WBC: CPT

## 2025-01-21 PROCEDURE — 80061 LIPID PANEL: CPT

## 2025-01-21 PROCEDURE — 80053 COMPREHEN METABOLIC PANEL: CPT

## 2025-01-21 PROCEDURE — 84443 ASSAY THYROID STIM HORMONE: CPT

## 2025-01-23 DIAGNOSIS — E03.9 HYPOTHYROIDISM, UNSPECIFIED TYPE: Primary | ICD-10-CM

## 2025-01-23 DIAGNOSIS — R79.89 ELEVATED TSH: ICD-10-CM

## 2025-01-23 DIAGNOSIS — I10 BENIGN ESSENTIAL HYPERTENSION: ICD-10-CM

## 2025-01-23 RX ORDER — LEVOTHYROXINE SODIUM 50 UG/1
50 TABLET ORAL DAILY
Qty: 60 TABLET | Refills: 0 | Status: SHIPPED | OUTPATIENT
Start: 2025-01-23 | End: 2026-01-23

## 2025-01-27 ENCOUNTER — APPOINTMENT (OUTPATIENT)
Dept: PRIMARY CARE | Facility: CLINIC | Age: 85
End: 2025-01-27
Payer: MEDICARE

## 2025-01-27 VITALS
OXYGEN SATURATION: 97 % | HEART RATE: 68 BPM | SYSTOLIC BLOOD PRESSURE: 136 MMHG | BODY MASS INDEX: 24.16 KG/M2 | TEMPERATURE: 97.5 F | DIASTOLIC BLOOD PRESSURE: 82 MMHG | HEIGHT: 57 IN | WEIGHT: 112 LBS

## 2025-01-27 DIAGNOSIS — R29.898 WEAKNESS OF BOTH LEGS: ICD-10-CM

## 2025-01-27 DIAGNOSIS — E03.9 HYPOTHYROIDISM, UNSPECIFIED TYPE: ICD-10-CM

## 2025-01-27 DIAGNOSIS — R42 VERTIGO: Primary | ICD-10-CM

## 2025-01-27 PROCEDURE — 3075F SYST BP GE 130 - 139MM HG: CPT | Performed by: FAMILY MEDICINE

## 2025-01-27 PROCEDURE — 1159F MED LIST DOCD IN RCRD: CPT | Performed by: FAMILY MEDICINE

## 2025-01-27 PROCEDURE — 99214 OFFICE O/P EST MOD 30 MIN: CPT | Performed by: FAMILY MEDICINE

## 2025-01-27 PROCEDURE — 3079F DIAST BP 80-89 MM HG: CPT | Performed by: FAMILY MEDICINE

## 2025-01-27 ASSESSMENT — ENCOUNTER SYMPTOMS
OCCASIONAL FEELINGS OF UNSTEADINESS: 1
LOSS OF SENSATION IN FEET: 0
DEPRESSION: 0
EYE REDNESS: 0
AGITATION: 0
RESPIRATORY NEGATIVE: 1
UNEXPECTED WEIGHT CHANGE: 0
DECREASED CONCENTRATION: 0
SHORTNESS OF BREATH: 0
BACK PAIN: 0
GASTROINTESTINAL NEGATIVE: 1
EYE ITCHING: 0
WEAKNESS: 1
APPETITE CHANGE: 0
CONSTITUTIONAL NEGATIVE: 1
CHEST TIGHTNESS: 0
FATIGUE: 0
NUMBNESS: 0

## 2025-01-27 ASSESSMENT — PATIENT HEALTH QUESTIONNAIRE - PHQ9
SUM OF ALL RESPONSES TO PHQ9 QUESTIONS 1 AND 2: 0
2. FEELING DOWN, DEPRESSED OR HOPELESS: NOT AT ALL
10. IF YOU CHECKED OFF ANY PROBLEMS, HOW DIFFICULT HAVE THESE PROBLEMS MADE IT FOR YOU TO DO YOUR WORK, TAKE CARE OF THINGS AT HOME, OR GET ALONG WITH OTHER PEOPLE: NOT DIFFICULT AT ALL
1. LITTLE INTEREST OR PLEASURE IN DOING THINGS: NOT AT ALL

## 2025-01-27 NOTE — PATIENT INSTRUCTIONS
Checking labs in the next 4-week including TSH, CPK, sed rate, CRP.    Ordering physical therapy for general strengthening and also for Epley maneuvers regarding vertigo.    Would like to know how you are feeling in the next 2 weeks.    All findings were discussed with patient and her sons.    Supplementing thyroid at this time.

## 2025-01-27 NOTE — ASSESSMENT & PLAN NOTE
TSH remains slightly elevated we are going to supplement with Synthroid and then we will recheck TSH level in 4 weeks

## 2025-01-27 NOTE — PROGRESS NOTES
"Subjective   Patient ID: Felicitas Vo is a 84 y.o. female who presents for Dizziness (gait instability ).    Patient presents having had issues of dizziness.  Her emergency    Over the last several weeks she has had dizziness suicidal history of vertigo in the past.  Also complains of some weakness in the legs.  Seems to be associated with the dizziness she states normally it is when she lays down onto the right side.  With getting up it seems to be causing the dizziness that room seems to be spinning around.  She has had no focal sensory or motor neurologic deficit.    She has had no swelling of the legs or feet.  No chest pain or shortness of breath no headache         Review of Systems   Constitutional: Negative.  Negative for appetite change, fatigue and unexpected weight change.   HENT: Negative.  Negative for drooling, ear pain and postnasal drip.    Eyes:  Negative for redness and itching.   Respiratory: Negative.  Negative for chest tightness and shortness of breath.    Cardiovascular:  Negative for leg swelling.   Gastrointestinal: Negative.    Musculoskeletal:  Negative for back pain.   Neurological:  Positive for weakness. Negative for numbness.   Psychiatric/Behavioral:  Negative for agitation and decreased concentration.        Objective   /82   Pulse 68   Temp 36.4 °C (97.5 °F)   Ht 1.448 m (4' 9\")   Wt 50.8 kg (112 lb)   SpO2 97%   BMI 24.24 kg/m²   BSA Body surface area is 1.43 meters squared.      Physical Exam  Constitutional:       General: She is not in acute distress.     Appearance: Normal appearance. She is not ill-appearing, toxic-appearing or diaphoretic.   HENT:      Head: Normocephalic.   Cardiovascular:      Rate and Rhythm: Normal rate and regular rhythm.      Pulses: Normal pulses.   Pulmonary:      Effort: Pulmonary effort is normal.   Musculoskeletal:      Cervical back: Normal range of motion.   Neurological:      General: No focal deficit present.      Mental Status: " She is alert and oriented to person, place, and time.      Cranial Nerves: No cranial nerve deficit.      Sensory: No sensory deficit.      Motor: Weakness present.      Gait: Gait abnormal.      Deep Tendon Reflexes: Reflexes normal.     Pam-Hallpike maneuver revealed some evidence of vertigo on the right side.  Some nystagmus appreciated.  Subsided at 45 seconds.  When coming up reproduce the vertigo again.    Cranial nerves II through XII are intact.    Complains of some weakness with squatting.  No redness no warmth noted of the thighs.  Able to stand on tiptoes and heels.  Flexion extension of the knee are intact.  Dorsiflexion of the foot are intact.  Lab on 01/21/2025   Component Date Value Ref Range Status    WBC 01/21/2025 4.3 (L)  4.4 - 11.3 x10*3/uL Final    nRBC 01/21/2025 0.0  0.0 - 0.0 /100 WBCs Final    RBC 01/21/2025 4.31  4.00 - 5.20 x10*6/uL Final    Hemoglobin 01/21/2025 13.4  12.0 - 16.0 g/dL Final    Hematocrit 01/21/2025 41.7  36.0 - 46.0 % Final    MCV 01/21/2025 97  80 - 100 fL Final    MCH 01/21/2025 31.1  26.0 - 34.0 pg Final    MCHC 01/21/2025 32.1  32.0 - 36.0 g/dL Final    RDW 01/21/2025 12.6  11.5 - 14.5 % Final    Platelets 01/21/2025 190  150 - 450 x10*3/uL Final    Neutrophils % 01/21/2025 61.2  40.0 - 80.0 % Final    Immature Granulocytes %, Automated 01/21/2025 0.5  0.0 - 0.9 % Final    Immature Granulocyte Count (IG) includes promyelocytes, myelocytes and metamyelocytes but does not include bands. Percent differential counts (%) should be interpreted in the context of the absolute cell counts (cells/UL).    Lymphocytes % 01/21/2025 25.3  13.0 - 44.0 % Final    Monocytes % 01/21/2025 9.5  2.0 - 10.0 % Final    Eosinophils % 01/21/2025 3.0  0.0 - 6.0 % Final    Basophils % 01/21/2025 0.5  0.0 - 2.0 % Final    Neutrophils Absolute 01/21/2025 2.63  1.60 - 5.50 x10*3/uL Final    Percent differential counts (%) should be interpreted in the context of the absolute cell counts (cells/uL).     Immature Granulocytes Absolute, Au* 01/21/2025 0.02  0.00 - 0.50 x10*3/uL Final    Lymphocytes Absolute 01/21/2025 1.09  0.80 - 3.00 x10*3/uL Final    Monocytes Absolute 01/21/2025 0.41  0.05 - 0.80 x10*3/uL Final    Eosinophils Absolute 01/21/2025 0.13  0.00 - 0.40 x10*3/uL Final    Basophils Absolute 01/21/2025 0.02  0.00 - 0.10 x10*3/uL Final    Glucose 01/21/2025 95  74 - 99 mg/dL Final    Sodium 01/21/2025 140  136 - 145 mmol/L Final    Potassium 01/21/2025 3.9  3.5 - 5.3 mmol/L Final    Chloride 01/21/2025 101  98 - 107 mmol/L Final    Bicarbonate 01/21/2025 33 (H)  21 - 32 mmol/L Final    Anion Gap 01/21/2025 10  10 - 20 mmol/L Final    Urea Nitrogen 01/21/2025 27 (H)  6 - 23 mg/dL Final    Creatinine 01/21/2025 0.74  0.50 - 1.05 mg/dL Final    eGFR 01/21/2025 81  >60 mL/min/1.73m*2 Final    Calculations of estimated GFR are performed using the 2021 CKD-EPI Study Refit equation without the race variable for the IDMS-Traceable creatinine methods.  https://jasn.asnjournals.org/content/early/2021/09/22/ASN.9563647727    Calcium 01/21/2025 9.7  8.6 - 10.6 mg/dL Final    Albumin 01/21/2025 4.6  3.4 - 5.0 g/dL Final    Alkaline Phosphatase 01/21/2025 73  33 - 136 U/L Final    Total Protein 01/21/2025 7.1  6.4 - 8.2 g/dL Final    AST 01/21/2025 27  9 - 39 U/L Final    Bilirubin, Total 01/21/2025 1.2  0.0 - 1.2 mg/dL Final    ALT 01/21/2025 13  7 - 45 U/L Final    Patients treated with Sulfasalazine may generate falsely decreased results for ALT.    Cholesterol 01/21/2025 190  0 - 199 mg/dL Final          Age      Desirable   Borderline High   High     0-19 Y     0 - 169       170 - 199     >/= 200    20-24 Y     0 - 189       190 - 224     >/= 225         >24 Y     0 - 199       200 - 239     >/= 240   **All ranges are based on fasting samples. Specific   therapeutic targets will vary based on patient-specific   cardiac risk.    Pediatric guidelines reference:Pediatrics 2011, 128(S5).Adult guidelines reference:  NCEP ATPIII Guidelines,PADDY 2001, 258:2486-97    Venipuncture immediately after or during the administration of Metamizole may lead to falsely low results. Testing should be performed immediately prior to Metamizole dosing.    HDL-Cholesterol 01/21/2025 79.9  mg/dL Final      Age       Very Low   Low     Normal    High    0-19 Y    < 35      < 40     40-45     ----  20-24 Y    ----     < 40      >45      ----        >24 Y      ----     < 40     40-60      >60      Cholesterol/HDL Ratio 01/21/2025 2.4   Final      Ref Values  Desirable  < 3.4  High Risk  > 5.0    LDL Calculated 01/21/2025 88  <=99 mg/dL Final                                Near   Borderline      AGE      Desirable  Optimal    High     High     Very High     0-19 Y     0 - 109     ---    110-129   >/= 130     ----    20-24 Y     0 - 119     ---    120-159   >/= 160     ----      >24 Y     0 -  99   100-129  130-159   160-189     >/=190      VLDL 01/21/2025 22  0 - 40 mg/dL Final    Triglycerides 01/21/2025 111  0 - 149 mg/dL Final    Age              Desirable        Borderline         High        Very High  SEX:B           mg/dL             mg/dL               mg/dL      mg/dL  <=14D                       ----               ----        ----  15D-365D                    ----               ----        ----  1Y-9Y           0-74               75-99             >=100       ----  10Y-19Y        0-89                            >=130       ----  20Y-24Y        0-114             115-149             >=150      ----  >= 25Y         0-149             150-199             200-499    >=500      Venipuncture immediately after or during the administration of Metamizole may lead to falsely low results. Testing should be performed immediately prior to Metamizole dosing.    Non HDL Cholesterol 01/21/2025 110  0 - 149 mg/dL Final          Age       Desirable   Borderline High   High     Very High     0-19 Y     0 - 119       120 - 144     >/= 145     >/= 160    20-24 Y     0 - 149       150 - 189     >/= 190      ----         >24 Y    30 mg/dL above LDL Cholesterol goal      Thyroid Stimulating Hormone 01/21/2025 5.47 (H)  0.44 - 3.98 mIU/L Final    Thyroxine, Free 01/21/2025 1.22  0.78 - 1.48 ng/dL Final   Lab on 04/18/2024   Component Date Value Ref Range Status    WBC 04/18/2024 4.0 (L)  4.4 - 11.3 x10*3/uL Final    nRBC 04/18/2024 0.0  0.0 - 0.0 /100 WBCs Final    RBC 04/18/2024 4.41  4.00 - 5.20 x10*6/uL Final    Hemoglobin 04/18/2024 13.5  12.0 - 16.0 g/dL Final    Hematocrit 04/18/2024 42.9  36.0 - 46.0 % Final    MCV 04/18/2024 97  80 - 100 fL Final    MCH 04/18/2024 30.6  26.0 - 34.0 pg Final    MCHC 04/18/2024 31.5 (L)  32.0 - 36.0 g/dL Final    RDW 04/18/2024 13.1  11.5 - 14.5 % Final    Platelets 04/18/2024 165  150 - 450 x10*3/uL Final    Neutrophils % 04/18/2024 68.6  40.0 - 80.0 % Final    Immature Granulocytes %, Automated 04/18/2024 0.2  0.0 - 0.9 % Final    Immature Granulocyte Count (IG) includes promyelocytes, myelocytes and metamyelocytes but does not include bands. Percent differential counts (%) should be interpreted in the context of the absolute cell counts (cells/UL).    Lymphocytes % 04/18/2024 20.8  13.0 - 44.0 % Final    Monocytes % 04/18/2024 8.2  2.0 - 10.0 % Final    Eosinophils % 04/18/2024 2.0  0.0 - 6.0 % Final    Basophils % 04/18/2024 0.2  0.0 - 2.0 % Final    Neutrophils Absolute 04/18/2024 2.77  1.60 - 5.50 x10*3/uL Final    Percent differential counts (%) should be interpreted in the context of the absolute cell counts (cells/uL).    Immature Granulocytes Absolute, Au* 04/18/2024 0.01  0.00 - 0.50 x10*3/uL Final    Lymphocytes Absolute 04/18/2024 0.84  0.80 - 3.00 x10*3/uL Final    Monocytes Absolute 04/18/2024 0.33  0.05 - 0.80 x10*3/uL Final    Eosinophils Absolute 04/18/2024 0.08  0.00 - 0.40 x10*3/uL Final    Basophils Absolute 04/18/2024 0.01  0.00 - 0.10 x10*3/uL Final    Protime 04/18/2024 11.3  9.8 - 12.8 seconds  Final    INR 04/18/2024 1.0  0.9 - 1.1 Final     Current Outpatient Medications on File Prior to Visit   Medication Sig Dispense Refill    anjali cit-mag-D3-Zn--man-bor (Citracal-D3 Plus Magnesium) 250- mg-mg-unit tablet Take 2 tablets by mouth once daily.      citalopram (CeleXA) 10 mg tablet TAKE ONE TABLET BY MOUTH EVERY DAY 90 tablet 1    donepezil (Aricept) 10 mg tablet Take 1 tablet (10 mg) by mouth once daily.      hydroCHLOROthiazide (Microzide) 12.5 mg tablet TAKE ONE TABLET BY MOUTH EVERY 12 HOURS 90 tablet 3    levothyroxine (Synthroid, Levoxyl) 50 mcg tablet Take 1 tablet (50 mcg) by mouth early in the morning.. Take on an empty stomach at the same time each day, either 30 to 60 minutes prior to breakfast 60 tablet 0    losartan (Cozaar) 50 mg tablet TAKE ONE TABLET BY MOUTH EVERY MORNING AND TAKE ONE TABLET BY MOUTH EVERY EVENING 180 tablet 0    multivit-min/iron/folic/lutein (CENTRUM SILVER WOMEN ORAL) Take 1 tablet by mouth once daily.      simvastatin (Zocor) 20 mg tablet TAKE ONE TABLET BY MOUTH EVERY EVENING (NEED BLOOD TEST AND APPOINTMENT WITH DR HOLLAND) 90 tablet 3    triamcinolone (Kenalog) 0.1 % oral paste Use in the mouth or throat 2 times a day. Apply to sore in mouth twice a day. 5 g 0     No current facility-administered medications on file prior to visit.     No images are attached to the encounter.            Assessment/Plan   Problem List Items Addressed This Visit             ICD-10-CM    Vertigo - Primary R42     Going to do physical therapy for Epley maneuvers         Weakness of both legs R29.898     Ordering physical therapy for strength         Hypothyroidism E03.9     TSH remains slightly elevated we are going to supplement with Synthroid and then we will recheck TSH level in 4 weeks

## 2025-02-14 DIAGNOSIS — I10 BENIGN ESSENTIAL HYPERTENSION: ICD-10-CM

## 2025-02-14 RX ORDER — LOSARTAN POTASSIUM 50 MG/1
TABLET ORAL
Qty: 180 TABLET | Refills: 3 | Status: SHIPPED | OUTPATIENT
Start: 2025-02-14

## 2025-03-01 ENCOUNTER — DOCUMENTATION (OUTPATIENT)
Dept: PRIMARY CARE | Facility: CLINIC | Age: 85
End: 2025-03-01
Payer: MEDICARE

## 2025-03-01 NOTE — PROGRESS NOTES
Notified by patient's son that if she has a rash on the outside of the breast tissue it appears to be thickened.  He states there is some scaling of the area of bruising.    Patient had what appeared to be bruising of the breast that was noted.  Ultrasound of the breast was ordered and mammogram diagnostic was ordered as well.  She refused to have this performed.  According to her son the area completely healed up.    She feels that this is now something new.    Discussion with him that this needs to be evaluated as we have recommended before wanted the patient to have the ultrasound and diagnostic mammogram.  This could possibly represent inflammatory breast cancer Likely need to see breast health specialist.    Will schedule appointment to have patient come in

## 2025-03-03 ENCOUNTER — OFFICE VISIT (OUTPATIENT)
Dept: PRIMARY CARE | Facility: CLINIC | Age: 85
End: 2025-03-03
Payer: MEDICARE

## 2025-03-03 VITALS
SYSTOLIC BLOOD PRESSURE: 137 MMHG | BODY MASS INDEX: 24.38 KG/M2 | HEART RATE: 60 BPM | WEIGHT: 113 LBS | OXYGEN SATURATION: 96 % | DIASTOLIC BLOOD PRESSURE: 85 MMHG | TEMPERATURE: 97 F | HEIGHT: 57 IN

## 2025-03-03 DIAGNOSIS — N64.59 ABNORMAL BREAST FINDING: ICD-10-CM

## 2025-03-03 DIAGNOSIS — R29.898 WEAKNESS OF BOTH LEGS: ICD-10-CM

## 2025-03-03 DIAGNOSIS — N64.4 BREAST PAIN, RIGHT: Primary | ICD-10-CM

## 2025-03-03 DIAGNOSIS — R73.01 ELEVATED FASTING GLUCOSE: ICD-10-CM

## 2025-03-03 DIAGNOSIS — E03.9 ACQUIRED HYPOTHYROIDISM: ICD-10-CM

## 2025-03-03 DIAGNOSIS — Z13.1 SCREENING FOR DIABETES MELLITUS (DM): ICD-10-CM

## 2025-03-03 DIAGNOSIS — R35.0 URINARY FREQUENCY: ICD-10-CM

## 2025-03-03 DIAGNOSIS — L30.9 NIPPLE DERMATITIS: ICD-10-CM

## 2025-03-03 PROBLEM — F02.A0 MILD LATE ONSET ALZHEIMER'S DEMENTIA WITHOUT BEHAVIORAL DISTURBANCE, PSYCHOTIC DISTURBANCE, MOOD DISTURBANCE, OR ANXIETY (MULTI): Status: RESOLVED | Noted: 2023-03-02 | Resolved: 2025-03-03

## 2025-03-03 PROBLEM — S06.9XAA TRAUMATIC BRAIN INJURY (MULTI): Status: RESOLVED | Noted: 2023-04-03 | Resolved: 2025-03-03

## 2025-03-03 PROBLEM — R00.2 PALPITATIONS: Status: RESOLVED | Noted: 2023-06-01 | Resolved: 2025-03-03

## 2025-03-03 PROBLEM — R42 VERTIGO: Status: RESOLVED | Noted: 2025-01-27 | Resolved: 2025-03-03

## 2025-03-03 PROBLEM — R63.4 WEIGHT LOSS: Status: RESOLVED | Noted: 2023-06-01 | Resolved: 2025-03-03

## 2025-03-03 PROBLEM — N64.89 HEMATOMA OF LEFT BREAST: Status: RESOLVED | Noted: 2024-04-18 | Resolved: 2025-03-03

## 2025-03-03 PROBLEM — R05.9 COUGH: Status: RESOLVED | Noted: 2023-06-01 | Resolved: 2025-03-03

## 2025-03-03 PROBLEM — U07.1 COVID-19: Status: RESOLVED | Noted: 2023-06-01 | Resolved: 2025-03-03

## 2025-03-03 PROBLEM — F43.20 REACTION, SITUATIONAL: Status: RESOLVED | Noted: 2023-06-01 | Resolved: 2025-03-03

## 2025-03-03 PROBLEM — G30.1 MILD LATE ONSET ALZHEIMER'S DEMENTIA WITHOUT BEHAVIORAL DISTURBANCE, PSYCHOTIC DISTURBANCE, MOOD DISTURBANCE, OR ANXIETY (MULTI): Status: RESOLVED | Noted: 2023-03-02 | Resolved: 2025-03-03

## 2025-03-03 LAB
POC APPEARANCE, URINE: CLEAR
POC BILIRUBIN, URINE: NEGATIVE
POC BLOOD, URINE: NEGATIVE
POC COLOR, URINE: YELLOW
POC GLUCOSE, URINE: NEGATIVE MG/DL
POC KETONES, URINE: NEGATIVE MG/DL
POC LEUKOCYTES, URINE: NEGATIVE
POC NITRITE,URINE: NEGATIVE
POC PH, URINE: 7 PH
POC PROTEIN, URINE: NEGATIVE MG/DL
POC SPECIFIC GRAVITY, URINE: 1.02
POC UROBILINOGEN, URINE: 0.2 EU/DL

## 2025-03-03 PROCEDURE — 3075F SYST BP GE 130 - 139MM HG: CPT | Performed by: FAMILY MEDICINE

## 2025-03-03 PROCEDURE — 1159F MED LIST DOCD IN RCRD: CPT | Performed by: FAMILY MEDICINE

## 2025-03-03 PROCEDURE — 1036F TOBACCO NON-USER: CPT | Performed by: FAMILY MEDICINE

## 2025-03-03 PROCEDURE — 3079F DIAST BP 80-89 MM HG: CPT | Performed by: FAMILY MEDICINE

## 2025-03-03 PROCEDURE — 1160F RVW MEDS BY RX/DR IN RCRD: CPT | Performed by: FAMILY MEDICINE

## 2025-03-03 PROCEDURE — G2211 COMPLEX E/M VISIT ADD ON: HCPCS | Performed by: FAMILY MEDICINE

## 2025-03-03 PROCEDURE — 99214 OFFICE O/P EST MOD 30 MIN: CPT | Performed by: FAMILY MEDICINE

## 2025-03-03 PROCEDURE — 81003 URINALYSIS AUTO W/O SCOPE: CPT | Performed by: FAMILY MEDICINE

## 2025-03-03 RX ORDER — TRIAMCINOLONE ACETONIDE 1 MG/G
OINTMENT TOPICAL 2 TIMES DAILY
Qty: 30 G | Refills: 0 | Status: SHIPPED | OUTPATIENT
Start: 2025-03-03

## 2025-03-03 ASSESSMENT — ENCOUNTER SYMPTOMS
ADENOPATHY: 0
HEADACHES: 0
BACK PAIN: 1
SEIZURES: 0
APPETITE CHANGE: 1
GASTROINTESTINAL NEGATIVE: 1
DIZZINESS: 0
SPEECH DIFFICULTY: 0
COLOR CHANGE: 1
PSYCHIATRIC NEGATIVE: 1
FREQUENCY: 1
FATIGUE: 1
WEAKNESS: 1
NUMBNESS: 0
BRUISES/BLEEDS EASILY: 0
DEPRESSION: 0
FACIAL ASYMMETRY: 0
LIGHT-HEADEDNESS: 0
TREMORS: 0
LOSS OF SENSATION IN FEET: 0
CARDIOVASCULAR NEGATIVE: 1
MYALGIAS: 1
OCCASIONAL FEELINGS OF UNSTEADINESS: 0

## 2025-03-03 NOTE — PATIENT INSTRUCTIONS
Apply the ointment 2 times a day for maximum of 7-10 days  Call if no improvement  Breast US of the right   Stop Hydrochlorothiazide  Consider therapy for the leg weakness  F/U in 2-3 weeks for breat abnormality  Blood work today or wednesday    Current Outpatient Medications   Medication Sig Dispense Refill    anjali cit-mag-D3-Zn--man-bor (Citracal-D3 Plus Magnesium) 250- mg-mg-unit tablet Take 2 tablets by mouth once daily.      citalopram (CeleXA) 10 mg tablet TAKE ONE TABLET BY MOUTH EVERY DAY 90 tablet 1    donepezil (Aricept) 10 mg tablet Take 1 tablet (10 mg) by mouth once daily.      levothyroxine (Synthroid, Levoxyl) 50 mcg tablet Take 1 tablet (50 mcg) by mouth early in the morning.. Take on an empty stomach at the same time each day, either 30 to 60 minutes prior to breakfast 60 tablet 0    losartan (Cozaar) 50 mg tablet TAKE ONE TABLET BY MOUTH EVERY MORNING AND TAKE ONE TABLET BY MOUTH EVERY EVENING 180 tablet 3    multivit-min/iron/folic/lutein (CENTRUM SILVER WOMEN ORAL) Take 1 tablet by mouth once daily.      simvastatin (Zocor) 20 mg tablet TAKE ONE TABLET BY MOUTH EVERY EVENING (NEED BLOOD TEST AND APPOINTMENT WITH DR HOLLAND) 90 tablet 3    triamcinolone (Kenalog) 0.1 % ointment Apply topically 2 times a day. Max of 10 day 30 g 0     No current facility-administered medications for this visit.

## 2025-03-03 NOTE — PROGRESS NOTES
"Subjective   Patient ID: Felicitas Vo is a 84 y.o. female who presents for Breast Pain (Right breast pain. Started awhile ago).  Today she is accompanied by alone.     HPI  Subjective:   Felicitas Vo is a 84 y.o. female with hypertension.  Current Outpatient Medications   Medication Sig Dispense Refill    anjali cit-mag-D3-Zn--man-bor (Citracal-D3 Plus Magnesium) 250- mg-mg-unit tablet Take 2 tablets by mouth once daily.      citalopram (CeleXA) 10 mg tablet TAKE ONE TABLET BY MOUTH EVERY DAY 90 tablet 1    donepezil (Aricept) 10 mg tablet Take 1 tablet (10 mg) by mouth once daily.      levothyroxine (Synthroid, Levoxyl) 50 mcg tablet Take 1 tablet (50 mcg) by mouth early in the morning.. Take on an empty stomach at the same time each day, either 30 to 60 minutes prior to breakfast 60 tablet 0    losartan (Cozaar) 50 mg tablet TAKE ONE TABLET BY MOUTH EVERY MORNING AND TAKE ONE TABLET BY MOUTH EVERY EVENING 180 tablet 3    multivit-min/iron/folic/lutein (CENTRUM SILVER WOMEN ORAL) Take 1 tablet by mouth once daily.      simvastatin (Zocor) 20 mg tablet TAKE ONE TABLET BY MOUTH EVERY EVENING (NEED BLOOD TEST AND APPOINTMENT WITH DR HOLLAND) 90 tablet 3    triamcinolone (Kenalog) 0.1 % ointment Apply topically 2 times a day. Max of 10 day 30 g 0     No current facility-administered medications for this visit.      Hypertension ROS: taking medications as instructed, medication side effects include: myalgias and leg cramps and increased frequency, no TIA's, no chest pain on exertion, no dyspnea on exertion, no swelling of ankles, no orthostatic dizziness or lightheadedness, no orthopnea or paroxysmal nocturnal dyspnea, and no palpitations.   New concerns: has a breast discoloration on the right side. No tenderness or pain of the breast.  No injuries. Started 2 weeks ago.    Objective:   /85   Pulse 60   Temp 36.1 °C (97 °F) (Oral)   Ht 1.448 m (4' 9\")   Wt 51.3 kg (113 lb)   SpO2 96%   BMI 24.45 " "kg/m²      Review of Systems   Constitutional:  Positive for appetite change and fatigue.   Cardiovascular: Negative.    Gastrointestinal: Negative.    Genitourinary:  Positive for frequency and urgency.   Musculoskeletal:  Positive for back pain, gait problem and myalgias.   Skin:  Positive for color change and rash.   Neurological:  Positive for weakness. Negative for dizziness, tremors, seizures, syncope, facial asymmetry, speech difficulty, light-headedness, numbness and headaches.   Hematological:  Negative for adenopathy. Does not bruise/bleed easily.   Psychiatric/Behavioral: Negative.     All other systems reviewed and are negative.      Objective   /85   Pulse 60   Temp 36.1 °C (97 °F) (Oral)   Ht 1.448 m (4' 9\")   Wt 51.3 kg (113 lb)   SpO2 96%   BMI 24.45 kg/m²       Physical Exam  Vitals and nursing note reviewed. Exam conducted with a chaperone present (L[facility] Carias).   Constitutional:       General: She is not in acute distress.     Appearance: Normal appearance. She is not ill-appearing, toxic-appearing or diaphoretic.   HENT:      Head: Normocephalic and atraumatic.      Right Ear: Tympanic membrane, ear canal and external ear normal. There is no impacted cerumen.      Left Ear: Tympanic membrane, ear canal and external ear normal. There is no impacted cerumen.      Nose: No congestion or rhinorrhea.      Mouth/Throat:      Mouth: Mucous membranes are moist.      Pharynx: Oropharynx is clear. No oropharyngeal exudate or posterior oropharyngeal erythema.   Eyes:      General: No scleral icterus.        Right eye: No discharge.         Left eye: No discharge.      Extraocular Movements: Extraocular movements intact.      Conjunctiva/sclera: Conjunctivae normal.      Pupils: Pupils are equal, round, and reactive to light.   Neck:      Vascular: No carotid bruit.   Cardiovascular:      Rate and Rhythm: Normal rate and regular rhythm.      Pulses: Normal pulses.      Heart sounds: Normal heart " sounds. No murmur heard.     No friction rub. No gallop.   Pulmonary:      Effort: Pulmonary effort is normal. No respiratory distress.      Breath sounds: Normal breath sounds. No stridor. No wheezing, rhonchi or rales.   Chest:      Chest wall: No mass, lacerations, deformity, swelling, tenderness, crepitus or edema. There is no dullness to percussion.   Breasts:     Right: Skin change present. No swelling, bleeding, inverted nipple, mass, nipple discharge or tenderness.      Left: Normal. No swelling, bleeding, inverted nipple, nipple discharge, skin change or tenderness.       Musculoskeletal:         General: Normal range of motion.      Cervical back: Normal range of motion and neck supple. No rigidity or tenderness.      Right lower leg: No edema.      Left lower leg: No edema.      Comments: Gait weakness and back pain   Lymphadenopathy:      Cervical: No cervical adenopathy.      Upper Body:      Right upper body: No supraclavicular, axillary or pectoral adenopathy.      Left upper body: No supraclavicular, axillary or pectoral adenopathy.   Skin:     General: Skin is warm and dry.   Neurological:      General: No focal deficit present.      Mental Status: She is alert and oriented to person, place, and time.      Sensory: No sensory deficit.      Motor: Weakness present.      Coordination: Coordination normal.      Gait: Gait abnormal.      Deep Tendon Reflexes: Reflexes normal.   Psychiatric:         Mood and Affect: Mood normal.         Behavior: Behavior normal.         Thought Content: Thought content normal.         Judgment: Judgment normal.         Assessment/Plan   Problem List Items Addressed This Visit             ICD-10-CM    Urinary frequency R35.0    Relevant Orders    POCT UA Automated manually resulted (Completed)    Hypothyroidism E03.9    Abnormal breast finding N64.59     Other Visit Diagnoses         Codes    Breast pain, right    -  Primary N64.4    Relevant Orders    BI US breast  complete right    Nipple dermatitis     L30.9    Relevant Medications    triamcinolone (Kenalog) 0.1 % ointment    Weakness of both legs     R29.898    Screening for diabetes mellitus (DM)     Z13.1    Relevant Orders    Hemoglobin A1c    Elevated fasting glucose     R73.01    Relevant Orders    Hemoglobin A1c          Current Outpatient Medications   Medication Sig Dispense Refill    anjali cit-mag-D3-Zn--man-bor (Citracal-D3 Plus Magnesium) 250- mg-mg-unit tablet Take 2 tablets by mouth once daily.      citalopram (CeleXA) 10 mg tablet TAKE ONE TABLET BY MOUTH EVERY DAY 90 tablet 1    donepezil (Aricept) 10 mg tablet Take 1 tablet (10 mg) by mouth once daily.      levothyroxine (Synthroid, Levoxyl) 50 mcg tablet Take 1 tablet (50 mcg) by mouth early in the morning.. Take on an empty stomach at the same time each day, either 30 to 60 minutes prior to breakfast 60 tablet 0    losartan (Cozaar) 50 mg tablet TAKE ONE TABLET BY MOUTH EVERY MORNING AND TAKE ONE TABLET BY MOUTH EVERY EVENING 180 tablet 3    multivit-min/iron/folic/lutein (CENTRUM SILVER WOMEN ORAL) Take 1 tablet by mouth once daily.      simvastatin (Zocor) 20 mg tablet TAKE ONE TABLET BY MOUTH EVERY EVENING (NEED BLOOD TEST AND APPOINTMENT WITH DR HOLLAND) 90 tablet 3    triamcinolone (Kenalog) 0.1 % ointment Apply topically 2 times a day. Max of 10 day 30 g 0     No current facility-administered medications for this visit.       Apply the ointment 2 times a day for maximum of 7-10 days  Call if no improvement  Breast US of the right   Stop Hydrochlorothiazide  Consider therapy for the leg weakness  F/U in 2-3 weeks for breat abnormality and HTN  Blood work today or Wednesday  Urine in the office was negative

## 2025-03-04 ENCOUNTER — APPOINTMENT (OUTPATIENT)
Dept: RADIOLOGY | Facility: CLINIC | Age: 85
End: 2025-03-04
Payer: MEDICARE

## 2025-03-05 ENCOUNTER — APPOINTMENT (OUTPATIENT)
Dept: RADIOLOGY | Facility: CLINIC | Age: 85
End: 2025-03-05
Payer: MEDICARE

## 2025-03-06 ENCOUNTER — APPOINTMENT (OUTPATIENT)
Dept: RADIOLOGY | Facility: CLINIC | Age: 85
End: 2025-03-06
Payer: MEDICARE

## 2025-03-17 DIAGNOSIS — N64.59 ABNORMAL BREAST FINDING: ICD-10-CM

## 2025-03-20 ENCOUNTER — HOSPITAL ENCOUNTER (OUTPATIENT)
Dept: RADIOLOGY | Facility: CLINIC | Age: 85
Discharge: HOME | End: 2025-03-20
Payer: MEDICARE

## 2025-03-20 ENCOUNTER — APPOINTMENT (OUTPATIENT)
Dept: CARDIOLOGY | Facility: HOSPITAL | Age: 85
End: 2025-03-20
Payer: MEDICARE

## 2025-03-20 ENCOUNTER — APPOINTMENT (OUTPATIENT)
Dept: PRIMARY CARE | Facility: CLINIC | Age: 85
End: 2025-03-20
Payer: MEDICARE

## 2025-03-20 ENCOUNTER — HOSPITAL ENCOUNTER (OUTPATIENT)
Dept: CARDIOLOGY | Facility: CLINIC | Age: 85
Discharge: HOME | End: 2025-03-20
Payer: MEDICARE

## 2025-03-20 VITALS
HEIGHT: 57 IN | OXYGEN SATURATION: 97 % | BODY MASS INDEX: 24.85 KG/M2 | SYSTOLIC BLOOD PRESSURE: 157 MMHG | WEIGHT: 115.2 LBS | TEMPERATURE: 98.6 F | DIASTOLIC BLOOD PRESSURE: 90 MMHG | HEART RATE: 62 BPM

## 2025-03-20 DIAGNOSIS — R00.1 BRADYCARDIA: ICD-10-CM

## 2025-03-20 DIAGNOSIS — I10 BENIGN ESSENTIAL HYPERTENSION: ICD-10-CM
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
## 2025-03-20 DIAGNOSIS — M54.17 LUMBOSACRAL RADICULOPATHY: ICD-10-CM
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
## 2025-03-20 DIAGNOSIS — R00.1 SINUS BRADYCARDIA: ICD-10-CM

## 2025-03-20 DIAGNOSIS — R00.2 PALPITATIONS: ICD-10-CM

## 2025-03-20 DIAGNOSIS — I49.9 CARDIAC ARRHYTHMIA, UNSPECIFIED CARDIAC ARRHYTHMIA TYPE: ICD-10-CM

## 2025-03-20 LAB — BODY SURFACE AREA: 1.45 M2

## 2025-03-20 PROCEDURE — 0518F FALL PLAN OF CARE DOCD: CPT | Performed by: FAMILY MEDICINE

## 2025-03-20 PROCEDURE — 99215 OFFICE O/P EST HI 40 MIN: CPT | Performed by: FAMILY MEDICINE

## 2025-03-20 PROCEDURE — 1100F PTFALLS ASSESS-DOCD GE2>/YR: CPT | Performed by: FAMILY MEDICINE

## 2025-03-20 PROCEDURE — 3077F SYST BP >= 140 MM HG: CPT | Performed by: FAMILY MEDICINE

## 2025-03-20 PROCEDURE — 1159F MED LIST DOCD IN RCRD: CPT | Performed by: FAMILY MEDICINE

## 2025-03-20 PROCEDURE — 72110 X-RAY EXAM L-2 SPINE 4/>VWS: CPT

## 2025-03-20 PROCEDURE — 3080F DIAST BP >= 90 MM HG: CPT | Performed by: FAMILY MEDICINE

## 2025-03-20 PROCEDURE — G2211 COMPLEX E/M VISIT ADD ON: HCPCS | Performed by: FAMILY MEDICINE

## 2025-03-20 PROCEDURE — 93225 XTRNL ECG REC<48 HRS REC: CPT

## 2025-03-20 PROCEDURE — 93000 ELECTROCARDIOGRAM COMPLETE: CPT | Performed by: FAMILY MEDICINE

## 2025-03-20 PROCEDURE — 1160F RVW MEDS BY RX/DR IN RCRD: CPT | Performed by: FAMILY MEDICINE

## 2025-03-20 PROCEDURE — 73502 X-RAY EXAM HIP UNI 2-3 VIEWS: CPT | Mod: LT

## 2025-03-20 RX ORDER — HYDROCHLOROTHIAZIDE 12.5 MG/1
25 CAPSULE ORAL DAILY
COMMUNITY
Start: 2025-03-20

## 2025-03-20 ASSESSMENT — ENCOUNTER SYMPTOMS
DEPRESSION: 0
LOSS OF SENSATION IN FEET: 0
OCCASIONAL FEELINGS OF UNSTEADINESS: 0

## 2025-03-20 ASSESSMENT — PATIENT HEALTH QUESTIONNAIRE - PHQ9
2. FEELING DOWN, DEPRESSED OR HOPELESS: NOT AT ALL
1. LITTLE INTEREST OR PLEASURE IN DOING THINGS: NOT AT ALL
SUM OF ALL RESPONSES TO PHQ9 QUESTIONS 1 AND 2: 0

## 2025-03-20 NOTE — PROGRESS NOTES
"Subjective   Patient ID: Felicitas Vo is a 85 y.o. female who presents for Follow-up (Htn & dermatitis on the breast).  Today she is accompanied by accompanied by child.     HPI  Here with her son for a follow up for right breast skin changes. She did not want to get the mammogram and [facility] would not do her US without the Mammogram. Referral to breast specialist was recommended and she has an appointment. She states that he cream helped and the rash is improving. There are no new changes and no further itching. Photos taken by her son showed improvement.   She also fell this past week. Did not know how she fell and did not feel it coming on. She found herself on the floor. Hurt her lower back and has some stiffness. No other changes. Denies any head injuries. No chaest pain or shortness of breath.     Review of Systems   All other systems reviewed and are negative.      Objective   /90 Comment: 148/92  Pulse 62   Temp 37 °C (98.6 °F) (Oral)   Ht 1.448 m (4' 9\")   Wt 52.3 kg (115 lb 3.2 oz)   SpO2 97%   BMI 24.93 kg/m²   BSA: 1.45 meters squared  Growth percentiles: Facility age limit for growth %laura is 20 years. Facility age limit for growth %laura is 20 years.     Physical Exam  Vitals and nursing note reviewed.   Constitutional:       General: She is not in acute distress.     Appearance: Normal appearance. She is not ill-appearing, toxic-appearing or diaphoretic.   HENT:      Head: Normocephalic and atraumatic.      Right Ear: Tympanic membrane, ear canal and external ear normal. There is no impacted cerumen.      Left Ear: Tympanic membrane, ear canal and external ear normal. There is no impacted cerumen.      Nose: No congestion or rhinorrhea.      Mouth/Throat:      Mouth: Mucous membranes are moist.      Pharynx: Oropharynx is clear. No oropharyngeal exudate or posterior oropharyngeal erythema.   Eyes:      General: No scleral icterus.        Right eye: No discharge.         Left eye: No discharge. "      Extraocular Movements: Extraocular movements intact.      Conjunctiva/sclera: Conjunctivae normal.      Pupils: Pupils are equal, round, and reactive to light.   Neck:      Vascular: No carotid bruit.   Cardiovascular:      Rate and Rhythm: Normal rate and regular rhythm.      Pulses: Normal pulses.      Heart sounds: Normal heart sounds. No murmur heard.     No friction rub. No gallop.   Pulmonary:      Effort: Pulmonary effort is normal. No respiratory distress.      Breath sounds: Normal breath sounds. No stridor. No wheezing, rhonchi or rales.   Chest:      Chest wall: No mass, lacerations or tenderness.   Breasts:     Right: Skin change (no scaling or any discharge.) present. No swelling, bleeding, inverted nipple, mass, nipple discharge or tenderness.      Left: Normal.   Musculoskeletal:         General: Tenderness present. Normal range of motion.      Cervical back: Normal range of motion and neck supple. No rigidity or tenderness.      Lumbar back: Signs of trauma, tenderness and bony tenderness present. No swelling, edema, deformity, lacerations or spasms. Normal range of motion. Negative right straight leg raise test and negative left straight leg raise test. No scoliosis.      Right lower leg: No edema.      Left lower leg: No edema.   Lymphadenopathy:      Cervical: No cervical adenopathy.      Upper Body:      Right upper body: No supraclavicular, axillary or pectoral adenopathy.   Skin:     General: Skin is warm and dry.   Neurological:      General: No focal deficit present.      Mental Status: She is alert and oriented to person, place, and time.   Psychiatric:         Mood and Affect: Mood normal.         Behavior: Behavior normal.         Thought Content: Thought content normal.         Judgment: Judgment normal.         Assessment/Plan   Problem List Items Addressed This Visit             ICD-10-CM    Benign essential hypertension I10    Relevant Medications    hydroCHLOROthiazide  (Microzide) 12.5 mg capsule     Other Visit Diagnoses         Codes    Fall, initial encounter    -  Primary W19.XXXA    Relevant Orders    ECG 12 lead (Clinic Performed)    XR hip left with pelvis when performed 2 or 3 views (Completed)    Lumbosacral radiculopathy     M54.17    Relevant Orders    XR lumbar spine complete 4+ views (Completed)    Sinus bradycardia     R00.1    Relevant Orders    ECG 12 lead (Clinic Performed)    Holter or Event Cardiac Monitor    Transthoracic Echo (TTE) Complete    Bradycardia     R00.1    Relevant Orders    ECG 12 lead (Clinic Performed)    Holter or Event Cardiac Monitor    Transthoracic Echo (TTE) Complete    Cardiac arrhythmia, unspecified cardiac arrhythmia type     I49.9    Relevant Orders    ECG 12 lead (Clinic Performed)    Holter or Event Cardiac Monitor    Transthoracic Echo (TTE) Complete    Palpitations     R00.2    Relevant Orders    ECG 12 lead (Clinic Performed)    Holter or Event Cardiac Monitor    Transthoracic Echo (TTE) Complete            Current Outpatient Medications   Medication Sig Dispense Refill    anjali cit-mag-D3-Zn--man-bor (Citracal-D3 Plus Magnesium) 250- mg-mg-unit tablet Take 2 tablets by mouth once daily.      citalopram (CeleXA) 10 mg tablet TAKE ONE TABLET BY MOUTH EVERY DAY 90 tablet 1    donepezil (Aricept) 10 mg tablet Take 1 tablet (10 mg) by mouth once daily.      losartan (Cozaar) 50 mg tablet TAKE ONE TABLET BY MOUTH EVERY MORNING AND TAKE ONE TABLET BY MOUTH EVERY EVENING 180 tablet 3    multivit-min/iron/folic/lutein (CENTRUM SILVER WOMEN ORAL) Take 1 tablet by mouth once daily.      simvastatin (Zocor) 20 mg tablet TAKE ONE TABLET BY MOUTH EVERY EVENING (NEED BLOOD TEST AND APPOINTMENT WITH DR HOLLAND) 90 tablet 3    triamcinolone (Kenalog) 0.1 % ointment Apply topically 2 times a day. Max of 10 day 30 g 0    hydroCHLOROthiazide (Microzide) 12.5 mg capsule Take 2 capsules (25 mg) by mouth once daily.       No current  facility-administered medications for this visit.     Felicitas was seen today for follow-up.  Diagnoses and all orders for this visit:  Fall, initial encounter (Primary)  -     ECG 12 lead (Clinic Performed)  -     XR hip left with pelvis when performed 2 or 3 views; Future  Benign essential hypertension  Lumbosacral radiculopathy  -     XR lumbar spine complete 4+ views; Future  Sinus bradycardia  -     ECG 12 lead (Clinic Performed)  -     Holter or Event Cardiac Monitor; Future  -     Transthoracic Echo (TTE) Complete; Future  Bradycardia  -     ECG 12 lead (Clinic Performed)  -     Holter or Event Cardiac Monitor; Future  -     Transthoracic Echo (TTE) Complete; Future  Cardiac arrhythmia, unspecified cardiac arrhythmia type  -     ECG 12 lead (Clinic Performed)  -     Holter or Event Cardiac Monitor; Future  -     Transthoracic Echo (TTE) Complete; Future  Palpitations  -     ECG 12 lead (Clinic Performed)  -     Holter or Event Cardiac Monitor; Future  -     Transthoracic Echo (TTE) Complete; Future    Specialty Consultation notes reviewed  Labs reviewed  Refills sent in  F/U in 1 month for HTN and fall  Declined PT  Continue current medications  Call if any new concerns  Please get the RSV vaccine at the pharmacy  Holter monitor was set up today and echo was scheduled at a later time  May need to see cardiology    I personally spent over 50% of a total 55 minutes in counseling and discussion with the patient and coordination of care as described above. Patient was identified as a fall risk. Risk prevention instructions provided.

## 2025-03-25 ENCOUNTER — HOSPITAL ENCOUNTER (OUTPATIENT)
Dept: CARDIOLOGY | Facility: CLINIC | Age: 85
Discharge: HOME | End: 2025-03-25
Payer: MEDICARE

## 2025-03-25 DIAGNOSIS — R00.2 PALPITATIONS: ICD-10-CM

## 2025-03-25 PROCEDURE — 93306 TTE W/DOPPLER COMPLETE: CPT | Performed by: STUDENT IN AN ORGANIZED HEALTH CARE EDUCATION/TRAINING PROGRAM

## 2025-03-25 PROCEDURE — 93306 TTE W/DOPPLER COMPLETE: CPT

## 2025-03-26 RX ORDER — VIT C/E/ZN/COPPR/LUTEIN/ZEAXAN 250MG-90MG
25 CAPSULE ORAL DAILY
COMMUNITY
Start: 2025-03-26 | End: 2026-03-26

## 2025-03-26 NOTE — PATIENT INSTRUCTIONS

## 2025-03-27 LAB
AORTIC VALVE MEAN GRADIENT: 4 MMHG
AORTIC VALVE PEAK VELOCITY: 1.48 M/S
AV PEAK GRADIENT: 9 MMHG
AVA (PEAK VEL): 1.58 CM2
AVA (VTI): 1.78 CM2
EJECTION FRACTION APICAL 4 CHAMBER: 63.8
EJECTION FRACTION: 57 %
LEFT ATRIUM VOLUME AREA LENGTH INDEX BSA: 12.4 ML/M2
LEFT VENTRICLE INTERNAL DIMENSION DIASTOLE: 3.2 CM (ref 3.5–6)
LEFT VENTRICULAR OUTFLOW TRACT DIAMETER: 1.8 CM
MITRAL VALVE E/A RATIO: 0.92
RIGHT VENTRICLE FREE WALL PEAK S': 8.7 CM/S
TRICUSPID ANNULAR PLANE SYSTOLIC EXCURSION: 2.4 CM

## 2025-04-03 LAB — BODY SURFACE AREA: 1.45 M2

## 2025-04-28 ENCOUNTER — APPOINTMENT (OUTPATIENT)
Dept: PRIMARY CARE | Facility: CLINIC | Age: 85
End: 2025-04-28
Payer: MEDICARE

## 2025-04-29 ENCOUNTER — TELEPHONE (OUTPATIENT)
Dept: PRIMARY CARE | Facility: CLINIC | Age: 85
End: 2025-04-29

## 2025-04-29 ENCOUNTER — OFFICE VISIT (OUTPATIENT)
Dept: PRIMARY CARE | Facility: CLINIC | Age: 85
End: 2025-04-29
Payer: MEDICARE

## 2025-04-29 VITALS
SYSTOLIC BLOOD PRESSURE: 117 MMHG | HEIGHT: 57 IN | TEMPERATURE: 97.4 F | HEART RATE: 67 BPM | DIASTOLIC BLOOD PRESSURE: 75 MMHG | OXYGEN SATURATION: 95 % | BODY MASS INDEX: 24.42 KG/M2 | WEIGHT: 113.2 LBS

## 2025-04-29 DIAGNOSIS — R73.9 HYPERGLYCEMIA: ICD-10-CM

## 2025-04-29 DIAGNOSIS — E03.9 HYPOTHYROIDISM, UNSPECIFIED TYPE: ICD-10-CM

## 2025-04-29 DIAGNOSIS — M54.50 ACUTE BILATERAL LOW BACK PAIN WITHOUT SCIATICA: ICD-10-CM

## 2025-04-29 DIAGNOSIS — M25.552 LEFT HIP PAIN: Primary | ICD-10-CM

## 2025-04-29 DIAGNOSIS — G30.1 MILD LATE ONSET ALZHEIMER'S DEMENTIA WITHOUT BEHAVIORAL DISTURBANCE, PSYCHOTIC DISTURBANCE, MOOD DISTURBANCE, OR ANXIETY (MULTI): ICD-10-CM

## 2025-04-29 DIAGNOSIS — F02.A0 MILD LATE ONSET ALZHEIMER'S DEMENTIA WITHOUT BEHAVIORAL DISTURBANCE, PSYCHOTIC DISTURBANCE, MOOD DISTURBANCE, OR ANXIETY (MULTI): ICD-10-CM

## 2025-04-29 DIAGNOSIS — I10 BENIGN ESSENTIAL HYPERTENSION: ICD-10-CM

## 2025-04-29 PROCEDURE — 99214 OFFICE O/P EST MOD 30 MIN: CPT | Performed by: FAMILY MEDICINE

## 2025-04-29 RX ORDER — HYDROCHLOROTHIAZIDE 12.5 MG/1
25 CAPSULE ORAL DAILY
Qty: 180 CAPSULE | Refills: 1 | Status: SHIPPED | OUTPATIENT
Start: 2025-04-29

## 2025-04-29 ASSESSMENT — PATIENT HEALTH QUESTIONNAIRE - PHQ9
1. LITTLE INTEREST OR PLEASURE IN DOING THINGS: NOT AT ALL
2. FEELING DOWN, DEPRESSED OR HOPELESS: NOT AT ALL
SUM OF ALL RESPONSES TO PHQ9 QUESTIONS 1 AND 2: 0

## 2025-04-29 ASSESSMENT — ENCOUNTER SYMPTOMS
DEPRESSION: 0
OCCASIONAL FEELINGS OF UNSTEADINESS: 0
LOSS OF SENSATION IN FEET: 0

## 2025-04-29 NOTE — PROGRESS NOTES
"Subjective   Patient ID: Felicitas Vo is a 85 y.o. female who presents for Follow-up (Pain & Holter monitor ).  History of Present Illness  Felicitas Vo is an 85 year old female with hypertension and memory concerns who presents for follow-up of multiple health issues.    She has experienced improvement in her rash after using a prescribed cream regularly for two weeks, starting on April 16, 2025. The rash has resolved, and she is now using regular Cerave cream.    Her pain, located on the left side, has improved but she notes a persistent limp while walking. She can walk without assistance.    Her blood pressure is well-controlled with a recent reading of 117/75 mmHg. She attributes her weight fluctuation to water retention and mentions a history of using hydrochlorothiazide, which was stopped due to urinary urgency but later resumed when her blood pressure increased. She currently takes it in the morning.    She has experienced some confusion, particularly after a recent illness that was not COVID-19. The confusion resolved with Claritin and throat lozenges. She also mentions difficulty with sleep, stating she dreams a lot, indicating some sleep occurs, but she feels she does not sleep well. She has never taken medication for sleep.    She has a history of elevated thyroid levels, which were previously treated with medication that was stopped. She has not had her thyroid rechecked recently. She notes a history of confusion, which she associates with her thyroid levels.    She reports urinary urgency, which has improved since adjusting her blood pressure medication to the morning. She still gets up at night but does not feel the urgency is as severe as before.    Review of Systems  ROS otherwise negative aside from what was mentioned above in HPI.    Objective     /75   Pulse 67   Temp 36.3 °C (97.4 °F) (Oral)   Ht 1.448 m (4' 9\")   Wt 51.3 kg (113 lb 3.2 oz)   SpO2 95%   BMI 24.50 kg/m²      Physical " Exam  VITALS: BP- 117/75  MEASUREMENTS: Weight- 113.  GENERAL: Alert, cooperative, well developed, no acute distress.  HEENT: Normocephalic, normal oropharynx, moist mucous membranes.  CHEST: Clear to auscultation bilaterally, no wheezes, rhonchi, or crackles.  CARDIOVASCULAR: Normal heart rate and rhythm, S1 and S2 normal without murmurs.  Breast exam is normal and skin on the right breast has cleared up  ABDOMEN: Soft, non-tender, non-distended, without organomegaly, normal bowel sounds.  EXTREMITIES: No cyanosis or edema.  MUSCULOSKELETAL: Left hip pain noted, likely due to arthritis.  NEUROLOGICAL: Cranial nerves grossly intact, moves all extremities without gross motor or sensory deficit.  SKIN: Rash significantly improved, skin smooth.    Assessment & Plan  Confusion  Recent episodes of confusion, possibly related to recent illness. History of elevated thyroid function, which can contribute to confusion. Memory issues managed with donepezil (Aricept) at lunchtime. Discussed potential impact of sleep and recent illness on memory and confusion.  - Order thyroid function tests to reassess levels.  - Consider memory assessment blood test.    Hypertension  Blood pressure well-controlled at 117/75 mmHg. Previous urinary urgency from hydrochlorothiazide addressed by adjusting dose timing to morning, improving symptoms. Prescription for hydrochlorothiazide renewed.  - Continue hydrochlorothiazide in the morning.    Arthritis  Chronic arthritis with left hip pain. Pain improved since last visit but still causes a limp. Normal X-ray suggests no acute changes. Pain expected to improve over time.  - Encourage exercises to strengthen the hip.    Sleep disturbance  Chronic sleep disturbance with difficulty sleeping through the night. Prefers not to use sleep medications. Discussed importance of sleep for overall health and memory.  - Encourage good sleep hygiene practices.    Breast skin changes  Previous breast skin  changes resolved with topical treatment. No mammographic evidence of malignancy. Surgeon did not perform punch biopsy due to symptom resolution. Continued use of medicated cream advised until resolution, now discontinued.  - Discontinue the medicated cream as the skin changes have resolved.    Assessment & Plan  Benign essential hypertension    Orders:    hydroCHLOROthiazide (Microzide) 12.5 mg capsule; Take 2 capsules (25 mg) by mouth once daily.    CBC and Auto Differential; Future    Comprehensive Metabolic Panel; Future    TSH with reflex to Free T4 if abnormal; Future    Hemoglobin A1C; Future    Mild late onset Alzheimer's dementia without behavioral disturbance, psychotic disturbance, mood disturbance, or anxiety (Multi)    Orders:    [facility] MISCELLANEOUS TEST (REFRIGERATED); Future    CBC and Auto Differential; Future    Comprehensive Metabolic Panel; Future    TSH with reflex to Free T4 if abnormal; Future    Hemoglobin A1C; Future    Hypothyroidism, unspecified type    Orders:    CBC and Auto Differential; Future    Comprehensive Metabolic Panel; Future    TSH with reflex to Free T4 if abnormal; Future    Hemoglobin A1C; Future    Hyperglycemia    Orders:    Hemoglobin A1C; Future    Acute bilateral low back pain without sciatica    Orders:    Referral to Physical Therapy; Future    Left hip pain    Orders:    Referral to Physical Therapy; Future      Current Outpatient Medications   Medication Instructions    anjali cit-mag-D3-Zn--man-bor (Citracal-D3 Plus Magnesium) 250- mg-mg-unit tablet 2 tablets, Daily    cholecalciferol (VITAMIN D3) 25 mcg, oral, Daily    citalopram (CELEXA) 10 mg, oral, Daily    donepezil (ARICEPT) 10 mg, Daily RT    hydroCHLOROthiazide (MICROZIDE) 25 mg, oral, Daily    losartan (Cozaar) 50 mg tablet TAKE ONE TABLET BY MOUTH EVERY MORNING AND TAKE ONE TABLET BY MOUTH EVERY EVENING    multivit-min/iron/folic/lutein (CENTRUM SILVER WOMEN ORAL) 1 tablet, Daily    simvastatin  (Zocor) 20 mg tablet TAKE ONE TABLET BY MOUTH EVERY EVENING (NEED BLOOD TEST AND APPOINTMENT WITH DR HOLLAND)    triamcinolone (Kenalog) 0.1 % ointment Topical, 2 times daily, Max of 10 day     Current Outpatient Medications   Medication Sig Dispense Refill    anjali cit-mag-D3-Zn--man-bor (Citracal-D3 Plus Magnesium) 250- mg-mg-unit tablet Take 2 tablets by mouth once daily.      cholecalciferol (Vitamin D3) 25 MCG (1000 UT) capsule Take 1 capsule (25 mcg) by mouth once daily.      citalopram (CeleXA) 10 mg tablet TAKE ONE TABLET BY MOUTH EVERY DAY 90 tablet 1    donepezil (Aricept) 10 mg tablet Take 1 tablet (10 mg) by mouth once daily.      losartan (Cozaar) 50 mg tablet TAKE ONE TABLET BY MOUTH EVERY MORNING AND TAKE ONE TABLET BY MOUTH EVERY EVENING 180 tablet 3    multivit-min/iron/folic/lutein (CENTRUM SILVER WOMEN ORAL) Take 1 tablet by mouth once daily.      simvastatin (Zocor) 20 mg tablet TAKE ONE TABLET BY MOUTH EVERY EVENING (NEED BLOOD TEST AND APPOINTMENT WITH DR OHLLAND) 90 tablet 3    triamcinolone (Kenalog) 0.1 % ointment Apply topically 2 times a day. Max of 10 day 30 g 0    hydroCHLOROthiazide (Microzide) 12.5 mg capsule Take 2 capsules (25 mg) by mouth once daily. 180 capsule 1     No current facility-administered medications for this visit.       Results  RADIOLOGY  Mammogram: No mammographic evidence of malignancy. Clinical follow-up recommended for skin changes. (04/16/2025)  Lab Review  Hospital Outpatient Visit on 03/25/2025   Component Date Value    AV pk vadim 03/25/2025 1.48     AV mn grad 03/25/2025 4     LVOT diam 03/25/2025 1.80     MV E/A ratio 03/25/2025 0.92     LA vol index A/L 03/25/2025 12.4     Tricuspid annular plane * 03/25/2025 2.4     LV EF 03/25/2025 57     RV free wall pk S' 03/25/2025 8.70     LVIDd 03/25/2025 3.20     Aortic Valve Area by Con* 03/25/2025 1.78     Aortic Valve Area by Con* 03/25/2025 1.58     AV pk grad 03/25/2025 9     LV A4C EF 03/25/2025 63.8     Hospital Outpatient Visit on 03/20/2025   Component Date Value    BSA 03/20/2025 1.45    Office Visit on 03/03/2025   Component Date Value    POC Color, Urine 03/03/2025 Yellow     POC Appearance, Urine 03/03/2025 Clear     POC Glucose, Urine 03/03/2025 NEGATIVE     POC Bilirubin, Urine 03/03/2025 NEGATIVE     POC Ketones, Urine 03/03/2025 NEGATIVE     POC Specific Gravity, Ur* 03/03/2025 1.025     POC Blood, Urine 03/03/2025 NEGATIVE     POC PH, Urine 03/03/2025 7.0     POC Protein, Urine 03/03/2025 NEGATIVE     POC Urobilinogen, Urine 03/03/2025 0.2     Poc Nitrite, Urine 03/03/2025 NEGATIVE     POC Leukocytes, Urine 03/03/2025 NEGATIVE        VISIT SUMMARY:  During your visit, we discussed several health issues including your confusion, hypertension, arthritis, sleep disturbance, and breast skin changes. Your rash has resolved with the use of the prescribed cream, and your blood pressure is well-controlled. We also talked about your recent confusion and memory issues, which may be related to your thyroid levels or recent illness. Your hip pain has improved, but you still have a limp. We reviewed your sleep disturbances and the importance of good sleep hygiene. Lastly, your breast skin changes have resolved, and you can discontinue the medicated cream.    YOUR PLAN:  -CONFUSION: Your recent confusion may be related to a recent illness or your thyroid levels. We will check your thyroid function with a blood test and consider a memory assessment blood test. Continue taking donepezil (Aricept) at lunchtime for memory support.    -HYPERTENSION: Your blood pressure is well-controlled at 117/75 mmHg. Continue taking hydrochlorothiazide in the morning to manage your blood pressure and reduce urinary urgency.    -ARTHRITIS: Your chronic arthritis in the left hip has improved, but you still have a limp. Arthritis is a condition that causes joint pain and stiffness. Continue exercises to strengthen your hip, which should  help improve your symptoms over time.    -SLEEP DISTURBANCE: You have difficulty sleeping through the night, which affects your overall health and memory. We discussed the importance of good sleep hygiene practices to help improve your sleep quality.    -BREAST SKIN CHANGES: Your breast skin changes have resolved with the use of the medicated cream. Since there is no evidence of malignancy, you can discontinue the medicated cream.    INSTRUCTIONS:  Please follow up with the thyroid function tests and consider the memory assessment blood test as discussed. Continue your current medications and lifestyle practices as advised. If you have any new symptoms or concerns, please schedule another appointment.    Krys Diaz MD     This medical note was created with the assistance of artificial intelligence (AI) for documentation purposes. The content has been reviewed and confirmed by the healthcare provider for accuracy and completeness. Patient consented to the use of audio recording and use of AI during their visit.   Patient was identified as a fall risk. Risk prevention instructions provided.

## 2025-05-01 NOTE — ASSESSMENT & PLAN NOTE
Orders:    CBC and Auto Differential; Future    Comprehensive Metabolic Panel; Future    TSH with reflex to Free T4 if abnormal; Future    Hemoglobin A1C; Future

## 2025-05-01 NOTE — ASSESSMENT & PLAN NOTE
Orders:    [facility] MISCELLANEOUS TEST (REFRIGERATED); Future    CBC and Auto Differential; Future    Comprehensive Metabolic Panel; Future    TSH with reflex to Free T4 if abnormal; Future    Hemoglobin A1C; Future

## 2025-05-01 NOTE — ASSESSMENT & PLAN NOTE
Orders:    hydroCHLOROthiazide (Microzide) 12.5 mg capsule; Take 2 capsules (25 mg) by mouth once daily.    CBC and Auto Differential; Future    Comprehensive Metabolic Panel; Future    TSH with reflex to Free T4 if abnormal; Future    Hemoglobin A1C; Future

## 2025-05-12 DIAGNOSIS — E78.5 HYPERLIPIDEMIA, UNSPECIFIED HYPERLIPIDEMIA TYPE: ICD-10-CM

## 2025-05-12 RX ORDER — SIMVASTATIN 20 MG/1
20 TABLET, FILM COATED ORAL NIGHTLY
Qty: 90 TABLET | Refills: 1 | Status: SHIPPED | OUTPATIENT
Start: 2025-05-12

## 2025-05-20 LAB
CK SERPL-CCNC: 216 U/L (ref 16–215)
CRP SERPL-MCNC: <3 MG/L
ERYTHROCYTE [SEDIMENTATION RATE] IN BLOOD BY WESTERGREN METHOD: 6 MM/H
TSH SERPL-ACNC: 5.3 MIU/L (ref 0.4–4.5)

## 2025-05-22 DIAGNOSIS — E03.9 HYPOTHYROIDISM, UNSPECIFIED TYPE: Primary | ICD-10-CM

## 2025-05-22 RX ORDER — LEVOTHYROXINE SODIUM 25 UG/1
25 TABLET ORAL
Qty: 90 TABLET | Refills: 0 | Status: SHIPPED | OUTPATIENT
Start: 2025-05-22 | End: 2025-08-20

## 2025-05-27 ENCOUNTER — APPOINTMENT (OUTPATIENT)
Dept: PRIMARY CARE | Facility: CLINIC | Age: 85
End: 2025-05-27
Payer: MEDICARE

## 2025-06-09 ENCOUNTER — APPOINTMENT (OUTPATIENT)
Dept: PRIMARY CARE | Facility: CLINIC | Age: 85
End: 2025-06-09
Payer: MEDICARE

## 2025-06-09 VITALS
HEART RATE: 59 BPM | OXYGEN SATURATION: 95 % | TEMPERATURE: 98.3 F | BODY MASS INDEX: 24.59 KG/M2 | HEIGHT: 57 IN | WEIGHT: 114 LBS | SYSTOLIC BLOOD PRESSURE: 130 MMHG | DIASTOLIC BLOOD PRESSURE: 71 MMHG

## 2025-06-09 DIAGNOSIS — Z78.0 ASYMPTOMATIC MENOPAUSAL STATE: ICD-10-CM

## 2025-06-09 DIAGNOSIS — Z71.89 CARDIAC RISK COUNSELING: ICD-10-CM

## 2025-06-09 DIAGNOSIS — N39.41 URGE INCONTINENCE OF URINE: ICD-10-CM

## 2025-06-09 DIAGNOSIS — G47.09 OTHER INSOMNIA: ICD-10-CM

## 2025-06-09 DIAGNOSIS — E53.8 VITAMIN B 12 DEFICIENCY: ICD-10-CM

## 2025-06-09 DIAGNOSIS — G30.1 MILD LATE ONSET ALZHEIMER'S DEMENTIA WITHOUT BEHAVIORAL DISTURBANCE, PSYCHOTIC DISTURBANCE, MOOD DISTURBANCE, OR ANXIETY (MULTI): ICD-10-CM

## 2025-06-09 DIAGNOSIS — E03.9 ACQUIRED HYPOTHYROIDISM: ICD-10-CM

## 2025-06-09 DIAGNOSIS — Z00.00 ROUTINE GENERAL MEDICAL EXAMINATION AT HEALTH CARE FACILITY: Primary | ICD-10-CM

## 2025-06-09 DIAGNOSIS — I10 BENIGN ESSENTIAL HYPERTENSION: ICD-10-CM

## 2025-06-09 DIAGNOSIS — Z23 NEED FOR VACCINATION: ICD-10-CM

## 2025-06-09 DIAGNOSIS — F43.20 ADJUSTMENT DISORDER, UNSPECIFIED TYPE: ICD-10-CM

## 2025-06-09 DIAGNOSIS — F02.A0 MILD LATE ONSET ALZHEIMER'S DEMENTIA WITHOUT BEHAVIORAL DISTURBANCE, PSYCHOTIC DISTURBANCE, MOOD DISTURBANCE, OR ANXIETY (MULTI): ICD-10-CM

## 2025-06-09 DIAGNOSIS — E55.9 VITAMIN D DEFICIENCY: ICD-10-CM

## 2025-06-09 DIAGNOSIS — E78.2 MIXED HYPERLIPIDEMIA: ICD-10-CM

## 2025-06-09 PROBLEM — H81.399 PERIPHERAL VERTIGO: Status: RESOLVED | Noted: 2023-06-01 | Resolved: 2025-06-09

## 2025-06-09 PROBLEM — N64.59 ABNORMAL BREAST FINDING: Status: RESOLVED | Noted: 2025-03-03 | Resolved: 2025-06-09

## 2025-06-09 RX ORDER — DIAPER,BRIEF,ADULT, DISPOSABLE
1 EACH MISCELLANEOUS 3 TIMES DAILY PRN
Qty: 180 EACH | Refills: 0 | Status: SHIPPED | OUTPATIENT
Start: 2025-06-09

## 2025-06-09 RX ORDER — TRAZODONE HYDROCHLORIDE 50 MG/1
TABLET ORAL
Qty: 30 TABLET | Refills: 0 | Status: SHIPPED | OUTPATIENT
Start: 2025-06-09

## 2025-06-09 SDOH — ECONOMIC STABILITY: FOOD INSECURITY: WITHIN THE PAST 12 MONTHS, THE FOOD YOU BOUGHT JUST DIDN'T LAST AND YOU DIDN'T HAVE MONEY TO GET MORE.: NEVER TRUE

## 2025-06-09 SDOH — ECONOMIC STABILITY: TRANSPORTATION INSECURITY
IN THE PAST 12 MONTHS, HAS THE LACK OF TRANSPORTATION KEPT YOU FROM MEDICAL APPOINTMENTS OR FROM GETTING MEDICATIONS?: NO

## 2025-06-09 SDOH — HEALTH STABILITY: PHYSICAL HEALTH: ON AVERAGE, HOW MANY MINUTES DO YOU ENGAGE IN EXERCISE AT THIS LEVEL?: 40 MIN

## 2025-06-09 SDOH — ECONOMIC STABILITY: FOOD INSECURITY: WITHIN THE PAST 12 MONTHS, YOU WORRIED THAT YOUR FOOD WOULD RUN OUT BEFORE YOU GOT MONEY TO BUY MORE.: NEVER TRUE

## 2025-06-09 SDOH — HEALTH STABILITY: PHYSICAL HEALTH: ON AVERAGE, HOW MANY DAYS PER WEEK DO YOU ENGAGE IN MODERATE TO STRENUOUS EXERCISE (LIKE A BRISK WALK)?: 7 DAYS

## 2025-06-09 SDOH — ECONOMIC STABILITY: TRANSPORTATION INSECURITY
IN THE PAST 12 MONTHS, HAS LACK OF TRANSPORTATION KEPT YOU FROM MEETINGS, WORK, OR FROM GETTING THINGS NEEDED FOR DAILY LIVING?: NO

## 2025-06-09 ASSESSMENT — LIFESTYLE VARIABLES
HOW OFTEN DO YOU HAVE SIX OR MORE DRINKS ON ONE OCCASION: NEVER
AUDIT-C TOTAL SCORE: 0
HOW OFTEN DO YOU HAVE A DRINK CONTAINING ALCOHOL: NEVER
SKIP TO QUESTIONS 9-10: 1
HOW MANY STANDARD DRINKS CONTAINING ALCOHOL DO YOU HAVE ON A TYPICAL DAY: PATIENT DOES NOT DRINK

## 2025-06-09 ASSESSMENT — MINI MENTAL STATE EXAM
WHAT STATE, COUNTRY, CITY, HOSPITAL, FLOOR: 3 CORRECT
SAY:  READ THE WORDS ON THE PAGE AND THEN DO WHAT IT SAYS.  THEN HAND THE PERSON THE SHEET WITH CLOSE YOUR EYES ON IT.  IF THE SUBJECT READS AND DOES NOT CLOSE THEIR EYES, REPEAT UP TO THREE TIMES.  SCORE ONLY IF SUBJECT CLOSES EYES.: 3 CORRECT
PLEASE COPY THIS PICTURE (NOTE ALL 10 ANGLES MUST BE PRESENT AND TWO MUST INTERSECT): 1 CORRECT
NAME OR REPEAT 3 OBJECTS - (APPLE, TABLE, PENNY) OR (BALL, TREE, FLAG): 3 CORRECT
SAY: I WOULD LIKE YOU TO REPEAT THIS PHRASE AFTER ME: NO IFS, ANDS, OR BUTS.: 1 CORRECT
SHOW: PENCIL [OBJECT] ASK: WHAT IS THIS CALLED?: 2 CORRECT
SPELL THE WORD WORLD FORWARD AND BACKWARDS OR SERIAL 7S: 5 CORRECT
HAND THE PERSON A PENCIL AND PAPER. SAY:  WRITE ANY COMPLETE SENTENCE ON THAT PIECE OF PAPER. (NOTE: THE SENTENCE MUST MAKE SENSE.  IGNORE SPELLING ERRORS): 1 CORRECT
RECALL THE 3 OBJECTS FROM ABOVE (APPLE, TABLE, PENNY) OR (BALL, TREE, FLAG): 3 CORRECT
SUM ALL MMSE QUESTIONS FOR TOTAL SCORE [OUT OF 30].: 27
PLACE DESIGN, ERASER AND PENCIL IN FRONT OF THE PERSON.  SAY:  COPY THIS DESIGN PLEASE.  SHOW: DESIGN. ALLOW: MULTIPLE TRIES. WAIT UNTIL PERSON IS FINISHED AND HANDS IT BACK. SCORE: ONLY FOR DIAGRAM WITH 4-SIDED FIGURE BETWEEN TWO 5-SIDED FIGURES: 1 CORRECT
WHAT IS THE YEAR, SEASON, DATE, DAY, AND MONTH: 4 CORRECT

## 2025-06-09 ASSESSMENT — ACTIVITIES OF DAILY LIVING (ADL)
BATHING: INDEPENDENT
MANAGING_FINANCES: NEEDS ASSISTANCE
DRESSING: INDEPENDENT
DOING_HOUSEWORK: NEEDS ASSISTANCE
TAKING_MEDICATION: NEEDS ASSISTANCE
GROCERY_SHOPPING: NEEDS ASSISTANCE

## 2025-06-09 ASSESSMENT — ENCOUNTER SYMPTOMS
OCCASIONAL FEELINGS OF UNSTEADINESS: 0
LOSS OF SENSATION IN FEET: 0
DEPRESSION: 0

## 2025-06-09 ASSESSMENT — SOCIAL DETERMINANTS OF HEALTH (SDOH)
WITHIN THE LAST YEAR, HAVE TO BEEN RAPED OR FORCED TO HAVE ANY KIND OF SEXUAL ACTIVITY BY YOUR PARTNER OR EX-PARTNER?: NO
HOW HARD IS IT FOR YOU TO PAY FOR THE VERY BASICS LIKE FOOD, HOUSING, MEDICAL CARE, AND HEATING?: NOT HARD AT ALL
WITHIN THE LAST YEAR, HAVE YOU BEEN HUMILIATED OR EMOTIONALLY ABUSED IN OTHER WAYS BY YOUR PARTNER OR EX-PARTNER?: NO
IN THE PAST 12 MONTHS, HAS THE ELECTRIC, GAS, OIL, OR WATER COMPANY THREATENED TO SHUT OFF SERVICE IN YOUR HOME?: NO
WITHIN THE LAST YEAR, HAVE YOU BEEN AFRAID OF YOUR PARTNER OR EX-PARTNER?: NO
WITHIN THE LAST YEAR, HAVE YOU BEEN KICKED, HIT, SLAPPED, OR OTHERWISE PHYSICALLY HURT BY YOUR PARTNER OR EX-PARTNER?: NO

## 2025-06-09 ASSESSMENT — PATIENT HEALTH QUESTIONNAIRE - PHQ9
1. LITTLE INTEREST OR PLEASURE IN DOING THINGS: NOT AT ALL
SUM OF ALL RESPONSES TO PHQ9 QUESTIONS 1 AND 2: 0
2. FEELING DOWN, DEPRESSED OR HOPELESS: NOT AT ALL

## 2025-06-09 NOTE — PATIENT INSTRUCTIONS
Specialty Consultation notes reviewed  Labs reviewed  Refills sent in  F/U in 3 months for insomnia  Continue current medications  Call if any new concerns  Please get the RSV vaccine at the pharmacy

## 2025-06-09 NOTE — PROGRESS NOTES
Subjective   Reason for Visit: Felicitas Vo is an85 y.o. female who presents for a Medicare Wellness visit.    Past Medical, Surgical, and Family History reviewed and updated in chart.    Reviewed all medications by prescribing practitioner or clinical pharmacist (such as prescriptions, OTCs, herbal therapies and supplements) and documented in the medical record.    History of Present Illness  Felicitas Vo is an 85 year old female who presents for a Medicare wellness visit.    She recently adjusted her thyroid medication to a dose of 25 mcg, resulting in the resolution of dizziness and a fainting episode. She feels more alert and stable on this dose.    Her walking has improved significantly, with no limping or hip pain following a previous fall. She attends the gym five days a week, engaging in both strenuous and light workouts, which she enjoys. Her balance has improved, and she no longer needs to hold onto walls or furniture for support.    Her weight has stabilized after previous weight loss. She has been controlling her diet, including reducing rice intake, to maintain her current weight.    She experiences occasional leg cramps, which have improved with regular use of a caravan before bed. The cramps are less frequent and not painful.    She has significant difficulty sleeping, unable to sleep at night and only resting during the day without actual sleep. She does not take regular sleep aids but recalls using a mild sleep aid after her 's passing.    She experiences itching in the genital area after urination, described as occurring on the external skin. She also has urgency with urination but no incontinence.    She takes citalopram at night and does not feel anxious or depressed. She describes herself as happy and socially active, having made new friends.    Review of Systems  ROS otherwise negative aside from what was mentioned above in HPI.  Medical History[1]  Social History     Socioeconomic  History    Marital status:      Spouse name: Not on file    Number of children: Not on file    Years of education: Not on file    Highest education level: Not on file   Occupational History    Not on file   Tobacco Use    Smoking status: Never     Passive exposure: Never    Smokeless tobacco: Never   Vaping Use    Vaping status: Never Used   Substance and Sexual Activity    Alcohol use: Yes     Comment: rarely    Drug use: Never    Sexual activity: Not Currently   Other Topics Concern    Not on file   Social History Narrative    Not on file     Social Drivers of Health     Financial Resource Strain: Not on file   Food Insecurity: Not on file   Transportation Needs: Not on file   Physical Activity: Not on file   Stress: Not on file   Social Connections: Not on file   Intimate Partner Violence: Not on file   Housing Stability: Not on file     Social Drivers of Health     Tobacco Use: Low Risk  (6/9/2025)    Patient History     Smoking Tobacco Use: Never     Smokeless Tobacco Use: Never     Passive Exposure: Never   Alcohol Use: Not At Risk (6/9/2025)    AUDIT-C     Frequency of Alcohol Consumption: Never     Average Number of Drinks: Patient does not drink     Frequency of Binge Drinking: Never   Financial Resource Strain: Low Risk  (6/9/2025)    Overall Financial Resource Strain (CARDIA)     Difficulty of Paying Living Expenses: Not hard at all   Food Insecurity: No Food Insecurity (6/9/2025)    Hunger Vital Sign     Worried About Running Out of Food in the Last Year: Never true     Ran Out of Food in the Last Year: Never true   Transportation Needs: No Transportation Needs (6/9/2025)    PRAPARE - Transportation     Lack of Transportation (Medical): No     Lack of Transportation (Non-Medical): No   Physical Activity: Sufficiently Active (6/9/2025)    Exercise Vital Sign     Days of Exercise per Week: 7 days     Minutes of Exercise per Session: 40 min   Stress: No Stress Concern Present (6/9/2025)    [employer] - Occupational Stress Questionnaire     Feeling of Stress : Not at all   Social Connections: Not on file   Intimate Partner Violence: Not At Risk (6/9/2025)    Humiliation, Afraid, Rape, and Kick questionnaire     Fear of Current or Ex-Partner: No     Emotionally Abused: No     Physically Abused: No     Sexually Abused: No   Depression: Not at risk (6/9/2025)    PHQ-2     PHQ-2 Score: 0   Housing Stability: Not on file   Utilities: Not At Risk (6/9/2025)    [facility] Utilities     Threatened with loss of utilities: No   Digital Equity: Not on file   Health Literacy: Not on file     Immunization History   Administered Date(s) Administered    Flu vaccine (IIV4), preservative free *Check age/dose* 11/01/2016    Flu vaccine, quadrivalent, high-dose, preservative free, age 65y+ (FLUZONE) 10/24/2022, 11/01/2023    Flu vaccine, trivalent, preservative free, HIGH-DOSE, age 65y+ (Fluzone) 09/28/2015    Flu vaccine, trivalent, preservative free, age 6 months and greater (Fluarix/Fluzone/Flulaval) 10/10/2013    Influenza, seasonal, injectable 11/05/2005, 11/24/2006, 09/30/2011, 09/29/2012, 10/05/2020    Moderna COVID-19 vaccine, 12 years and older (50mcg/0.5mL)(Spikevax) 11/01/2023    Moderna SARS-CoV-2 Vaccination 02/08/2021, 03/09/2021, 10/25/2021    Pneumococcal polysaccharide vaccine, 23-valent, age 2 years and older (PNEUMOVAX 23) 10/28/2014    Td (adult), unspecified 05/04/2000    Zoster vaccine, recombinant, adult (SHINGRIX) 01/07/2020, 08/11/2020     Family History[2]  Surgical History[3]  Current Medications[4]  RX Allergies[5]  Problem List[6]    Lab Results   Component Value Date    WBC 4.3 (L) 01/21/2025    HGB 13.4 01/21/2025    HCT 41.7 01/21/2025     01/21/2025    CHOL 190 01/21/2025    TRIG 111 01/21/2025    HDL 79.9 01/21/2025    ALT 13 01/21/2025    AST 27 01/21/2025     01/21/2025    K 3.9 01/21/2025     01/21/2025    CREATININE 0.74 01/21/2025    BUN 27 (H)  "01/21/2025    CO2 33 (H) 01/21/2025    TSH 5.30 (H) 05/19/2025    INR 1.0 04/18/2024    HGBA1C 5.6 11/17/2017     Social Drivers of Health     Tobacco Use: Low Risk  (6/9/2025)    Patient History     Smoking Tobacco Use: Never     Smokeless Tobacco Use: Never     Passive Exposure: Never   Alcohol Use: Not on file   Financial Resource Strain: Not on file   Food Insecurity: Not on file   Transportation Needs: Not on file   Physical Activity: Not on file   Stress: Not on file   Social Connections: Not on file   Intimate Partner Violence: Not on file   Depression: Not at risk (6/9/2025)    PHQ-2     PHQ-2 Score: 0   Housing Stability: Not on file   Utilities: Not on file   Digital Equity: Not on file   Health Literacy: Not on file       Objective     /71   Pulse 59   Temp 36.8 °C (98.3 °F) (Oral)   Ht 1.448 m (4' 9\")   Wt 51.7 kg (114 lb)   SpO2 95%   BMI 24.67 kg/m²      Current Outpatient Medications   Medication Instructions    anjali cit-mag-D3-Zn--man-bor (Citracal-D3 Plus Magnesium) 250- mg-mg-unit tablet 2 tablets, Daily    cholecalciferol (VITAMIN D3) 25 mcg, oral, Daily    citalopram (CELEXA) 10 mg, oral, Daily    donepezil (ARICEPT) 10 mg, Daily RT    hydroCHLOROthiazide (MICROZIDE) 25 mg, oral, Daily    levothyroxine (SYNTHROID, LEVOXYL) 25 mcg, oral, Daily before breakfast    losartan (Cozaar) 50 mg tablet TAKE ONE TABLET BY MOUTH EVERY MORNING AND TAKE ONE TABLET BY MOUTH EVERY EVENING    multivit-min/iron/folic/lutein (CENTRUM SILVER WOMEN ORAL) 1 tablet, Daily    simvastatin (ZOCOR) 20 mg, oral, Nightly    traZODone (Desyrel) 50 mg tablet Take 1/2 -1 tablet 1 hour prior to bedtime as needed for sleep    triamcinolone (Kenalog) 0.1 % ointment Topical, 2 times daily, Max of 10 day       Physical Exam  GENERAL: Alert, cooperative, well developed, no acute distress  HEENT: Normocephalic, normal oropharynx, moist mucous membranes  CHEST: Clear to auscultation bilaterally, No wheezes, rhonchi, " or crackles  CARDIOVASCULAR: Normal heart rate and rhythm, S1 and S2 normal without murmurs  BREAST: Breasts normal  ABDOMEN: Soft, non-tender, non-distended, without organomegaly, Normal bowel sounds  GENITOURINARY: External vagina normal  EXTREMITIES: No cyanosis or edema  NEUROLOGICAL: Cranial nerves grossly intact, Moves all extremities without gross motor or sensory deficit    Assessment & Plan  Insomnia  She experiences significant difficulty sleeping at night, unable to sleep and only resting during the day. She denies previous use of regular sleep aids. Trazodone was discussed as a potential non-addictive sleep aid, beneficial for maintaining sleep and aiding in falling back asleep if awakened.  - Initiate trazodone as needed for sleep, starting with half a tablet and adjusting based on response.    Hypothyroidism  She was initiated on levothyroxine due to elevated TSH and symptoms of hypothyroidism, including dizziness and fainting. The dose was reduced from 50 mcg to 25 mcg due to side effect concerns. She reports symptom improvement, including reduced dizziness and increased alertness.  - Continue current levothyroxine dose.  - Order TSH test in six weeks to monitor thyroid function.    Osteopenia  She has osteopenia and was previously on Fosamax. A bone density test is planned to reassess bone health.  - Order bone density test to evaluate current bone health.    General Health Maintenance  She is due for vaccinations and routine blood work. The importance of preventive measures was emphasized, including RSV and pneumonia vaccines. The pneumonia vaccine was administered today, and she was advised to obtain the RSV vaccine at the pharmacy, covered by Medicare. Routine blood tests were ordered, including TSH, diabetes tests, and a metabolic panel, with no fasting required.  - Administer pneumonia vaccine today.  - Advise obtaining RSV vaccine at the pharmacy, covered by Medicare.  - Order blood tests  including TSH, diabetes tests, and metabolic panel. No fasting required.    Follow-up  Plans to monitor her response to trazodone and thyroid function with upcoming tests. A follow-up appointment is scheduled in three months to reassess her condition and adjust treatment as necessary.  - Schedule follow-up appointment in three months.  - Monitor response to trazodone and adjust dosage if necessary.  - Re-evaluate thyroid function with TSH test results in six weeks.    Assessment/Plan   Assessment & Plan  Routine general medical examination at health care facility    Orders:    Vitamin D 25-Hydroxy,Total (for eval of Vitamin D levels); Future    Vitamin B12; Future    CBC and Auto Differential; Future    TSH with reflex to Free T4 if abnormal; Future    Comprehensive Metabolic Panel; Future    Hemoglobin A1C; Future    1 Year Follow Up In [facility] - PCP - Wellness Exam; Future    Vitamin D deficiency    Orders:    Vitamin D 25-Hydroxy,Total (for eval of Vitamin D levels); Future    Vitamin B 12 deficiency    Orders:    Vitamin B12; Future    Benign essential hypertension      Orders:    CBC and Auto Differential; Future     Acquired hypothyroidism      Orders:    TSH with reflex to Free T4 if abnormal; Future     Mixed hyperlipidemia    Orders:    3 Month Follow Up In [facility] - PCP; Future    Other insomnia    Orders:    traZODone (Desyrel) 50 mg tablet; Take 1/2 -1 tablet 1 hour prior to bedtime as needed for sleep    3 Month Follow Up In [facility] - PCP; Future    Mild late onset Alzheimer's dementia without behavioral disturbance, psychotic disturbance, mood disturbance, or anxiety (Multi)            Cardiac risk counseling  The ASCVD Risk score (Cassy CAMPBELL, et al., 2019) failed to calculate for the following reasons:    The 2019 ASCVD risk score is only valid for ages 40 to 79  Time spent was 10 mins reviewing       Need for vaccination    Orders:    Pneumococcal vaccine  20-valent    Asymptomatic menopausal state    Orders:    XR DEXA bone density; Future    Urge incontinence of urine    Orders:    diaper,brief,adult,disposable (Depend Silhouette Women S/M) misc; 1 Pad 3 times a day as needed (incontinence).           Krys Diaz MD       Advance Directives Discussion  16 - 20 minutes were spent discussing Advanced Care Planning (including a Living Will, Medical Power Of , as well as specific end of life choices and/or directives). The details of that discussion were documented in Advanced Directives Discussion section of the medical record.     Alcohol Misuse Screen  5 - 10 minutes were spent screening the patient for alcohol misuse disorder.    Cardiac Risk Assessment  15 - 20 minutes were spent discussing Cardiovascular risk and, if needed, lifestyle modifications were recommended, including nutritional choices, exercise, and elimination of habits contributing to risk.   Aspirin use/disuse was discussed following the guidelines below:  low dose ASA ( mg) should be considered:    If prior Heart Attack/Stroke/Peripheral vascular disease:  Generally recommend daily low dose aspirin unless extremely high bleeding risk (e.g., gastrointestinal).    If no prior Heart Attack/Stroke/Peripheral vascular disease:              Age over 70: Do not use Aspirin for prevention    Age less than 70 and 10-year cardiovascular disease risk is >20%: use low dose Aspirin for prevention.                 Depression Screening  5 - 10 minutes were spent screening for depression.       This medical note was created with the assistance of artificial intelligence (AI) for documentation purposes. The content has been reviewed and confirmed by the healthcare provider for accuracy and completeness. Patient consented to the use of audio recording and use of AI during their visit.          [1]   Past Medical History:  Diagnosis Date    Abnormal breast finding 03/03/2025    Body mass index (BMI)  19.9 or less, adult 12/15/2020    Body mass index (BMI) of 19.9 or less in adult    Bursitis of unspecified shoulder 10/29/2014    Bursitis, shoulder    Encounter for immunization     Need for vaccination    Furuncle, unspecified 03/07/2022    Boil    Other conditions influencing health status 11/07/2013    A Fall    Pain in right shoulder 10/28/2014    Pain in joint of right shoulder    Peripheral vertigo 06/01/2023    Personal history of diseases of the skin and subcutaneous tissue 04/24/2015    History of eczema    Personal history of other diseases of the musculoskeletal system and connective tissue 11/05/2020    History of muscle pain    Personal history of other diseases of the respiratory system 12/14/2020    History of allergic rhinitis    Personal history of other endocrine, nutritional and metabolic disease 02/25/2019    History of hypoglycemia    Personal history of other mental and behavioral disorders 12/14/2020    History of anxiety    Personal history of other specified conditions 10/28/2014    History of headache    Personal history of other specified conditions 03/07/2022    History of dysuria    Personal history of other specified conditions 02/25/2019    History of fatigue   [2]   Family History  Problem Relation Name Age of Onset    Heart disease Mother      Diabetes Father     [3] History reviewed. No pertinent surgical history.  [4]   Current Outpatient Medications:     anjali cit-mag-D3-Zn--man-bor (Citracal-D3 Plus Magnesium) 250- mg-mg-unit tablet, Take 2 tablets by mouth once daily., Disp: , Rfl:     cholecalciferol (Vitamin D3) 25 MCG (1000 UT) capsule, Take 1 capsule (25 mcg) by mouth once daily., Disp: , Rfl:     citalopram (CeleXA) 10 mg tablet, TAKE ONE TABLET BY MOUTH EVERY DAY, Disp: 90 tablet, Rfl: 1    donepezil (Aricept) 10 mg tablet, Take 1 tablet (10 mg) by mouth once daily., Disp: , Rfl:     hydroCHLOROthiazide (Microzide) 12.5 mg capsule, Take 2 capsules (25 mg) by mouth  once daily., Disp: 180 capsule, Rfl: 1    levothyroxine (Synthroid, Levoxyl) 25 mcg tablet, Take 1 tablet (25 mcg) by mouth once daily in the morning. Take before meals., Disp: 90 tablet, Rfl: 0    losartan (Cozaar) 50 mg tablet, TAKE ONE TABLET BY MOUTH EVERY MORNING AND TAKE ONE TABLET BY MOUTH EVERY EVENING, Disp: 180 tablet, Rfl: 3    multivit-min/iron/folic/lutein (CENTRUM SILVER WOMEN ORAL), Take 1 tablet by mouth once daily., Disp: , Rfl:     simvastatin (Zocor) 20 mg tablet, TAKE ONE TABLET BY MOUTH EVERY EVENING, Disp: 90 tablet, Rfl: 1    triamcinolone (Kenalog) 0.1 % ointment, Apply topically 2 times a day. Max of 10 day, Disp: 30 g, Rfl: 0    traZODone (Desyrel) 50 mg tablet, Take 1/2 -1 tablet 1 hour prior to bedtime as needed for sleep, Disp: 30 tablet, Rfl: 0  [5] No Known Allergies  [6]   Patient Active Problem List  Diagnosis    Restless leg syndrome    Benign essential hypertension    Edema of both legs    Fluid in endometrial cavity    Hyperlipidemia    Mild late onset Alzheimer's dementia without behavioral disturbance, psychotic disturbance, mood disturbance, or anxiety (Multi)    Osteoporosis    Shoulder impingement, left    Urinary frequency    Hypothyroidism    Other insomnia      no change

## 2025-06-09 NOTE — ACP (ADVANCE CARE PLANNING)
Confirming Previous Code Status: Yes    Advance Care Planning Note     Discussion Date: 06/09/25   Discussion Participants: patient    The patient wishes to discuss Advance Care Planning today and the following is a brief summary of our discussion.     Patient has capacity to make their own medical decisions: Yes  Health Care Agent/Surrogate Decision Maker documented in chart: Yes    Documents on file and valid:  Advance Directive/Living Will: No   Health Care Power of : No  Other: code status discussed    Communication of Medical Status/Prognosis:   good     Communication of Treatment Goals/Options:   good     Treatment Decisions  Goals of Care: survival is prioritized, if goals for quality or survival can reasonably be achieved   agree  Follow Up Plan  To bring in POA and Living will  Team Members  PCP  Time Statement: Total face to face time spent on advance care planning was 16 minutes with 16 minutes spent in counseling, including the explanation.    Krys Diaz MD  6/9/2025 5:32 PM  Patient Care Team:  Krys Diaz MD as PCP - General (Family Medicine)  Roque Churchill DO as PCP - [employer] PCP  Krys Diaz MD as Referring Physician (Family Medicine)

## 2025-06-09 NOTE — ASSESSMENT & PLAN NOTE
Orders:    traZODone (Desyrel) 50 mg tablet; Take 1/2 -1 tablet 1 hour prior to bedtime as needed for sleep    3 Month Follow Up In [facility] - PCP; Future

## 2025-06-10 RX ORDER — CITALOPRAM 10 MG/1
10 TABLET ORAL DAILY
Qty: 90 TABLET | Refills: 1 | Status: SHIPPED | OUTPATIENT
Start: 2025-06-10

## 2025-08-07 DIAGNOSIS — G47.09 OTHER INSOMNIA: ICD-10-CM

## 2025-08-07 RX ORDER — TRAZODONE HYDROCHLORIDE 50 MG/1
TABLET ORAL
Qty: 30 TABLET | Refills: 0 | Status: SHIPPED | OUTPATIENT
Start: 2025-08-07

## 2025-08-08 DIAGNOSIS — E78.5 HYPERLIPIDEMIA, UNSPECIFIED HYPERLIPIDEMIA TYPE: ICD-10-CM

## 2025-08-09 RX ORDER — SIMVASTATIN 20 MG/1
20 TABLET, FILM COATED ORAL NIGHTLY
Qty: 90 TABLET | Refills: 3 | Status: SHIPPED | OUTPATIENT
Start: 2025-08-09

## 2025-08-15 DIAGNOSIS — E03.9 HYPOTHYROIDISM, UNSPECIFIED TYPE: ICD-10-CM

## 2025-08-16 RX ORDER — LEVOTHYROXINE SODIUM 25 UG/1
TABLET ORAL
Qty: 90 TABLET | Refills: 0 | Status: SHIPPED | OUTPATIENT
Start: 2025-08-16

## 2025-09-08 ENCOUNTER — APPOINTMENT (OUTPATIENT)
Dept: PRIMARY CARE | Facility: CLINIC | Age: 85
End: 2025-09-08
Payer: MEDICARE